# Patient Record
Sex: MALE | Race: OTHER | NOT HISPANIC OR LATINO | URBAN - METROPOLITAN AREA
[De-identification: names, ages, dates, MRNs, and addresses within clinical notes are randomized per-mention and may not be internally consistent; named-entity substitution may affect disease eponyms.]

---

## 2017-02-22 ENCOUNTER — OUTPATIENT (OUTPATIENT)
Dept: OUTPATIENT SERVICES | Facility: HOSPITAL | Age: 62
LOS: 1 days | End: 2017-02-22
Payer: COMMERCIAL

## 2017-02-22 DIAGNOSIS — R10.9 UNSPECIFIED ABDOMINAL PAIN: ICD-10-CM

## 2017-02-22 DIAGNOSIS — R07.9 CHEST PAIN, UNSPECIFIED: ICD-10-CM

## 2017-02-22 PROCEDURE — 71250 CT THORAX DX C-: CPT | Mod: 26

## 2017-02-22 PROCEDURE — 74176 CT ABD & PELVIS W/O CONTRAST: CPT

## 2017-02-22 PROCEDURE — 74176 CT ABD & PELVIS W/O CONTRAST: CPT | Mod: 26

## 2017-02-22 PROCEDURE — 71250 CT THORAX DX C-: CPT

## 2017-03-08 ENCOUNTER — OUTPATIENT (OUTPATIENT)
Dept: OUTPATIENT SERVICES | Facility: HOSPITAL | Age: 62
LOS: 1 days | End: 2017-03-08
Payer: COMMERCIAL

## 2017-03-08 DIAGNOSIS — R13.10 DYSPHAGIA, UNSPECIFIED: ICD-10-CM

## 2017-03-08 PROCEDURE — 74220 X-RAY XM ESOPHAGUS 1CNTRST: CPT

## 2017-03-08 PROCEDURE — 74220 X-RAY XM ESOPHAGUS 1CNTRST: CPT | Mod: 26

## 2017-04-11 ENCOUNTER — EMERGENCY (EMERGENCY)
Facility: HOSPITAL | Age: 62
LOS: 1 days | Discharge: HOME | End: 2017-04-11
Attending: EMERGENCY MEDICINE
Payer: MEDICARE

## 2017-04-11 VITALS
HEIGHT: 65 IN | WEIGHT: 149.91 LBS | RESPIRATION RATE: 18 BRPM | DIASTOLIC BLOOD PRESSURE: 87 MMHG | SYSTOLIC BLOOD PRESSURE: 144 MMHG | OXYGEN SATURATION: 100 % | TEMPERATURE: 98 F | HEART RATE: 84 BPM

## 2017-04-11 VITALS
TEMPERATURE: 98 F | RESPIRATION RATE: 16 BRPM | OXYGEN SATURATION: 99 % | HEART RATE: 80 BPM | DIASTOLIC BLOOD PRESSURE: 77 MMHG | SYSTOLIC BLOOD PRESSURE: 136 MMHG

## 2017-04-11 DIAGNOSIS — S01.01XA LACERATION WITHOUT FOREIGN BODY OF SCALP, INITIAL ENCOUNTER: ICD-10-CM
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
## 2017-04-11 DIAGNOSIS — Z88.0 ALLERGY STATUS TO PENICILLIN: ICD-10-CM

## 2017-04-11 DIAGNOSIS — Z88.8 ALLERGY STATUS TO OTHER DRUGS, MEDICAMENTS AND BIOLOGICAL SUBSTANCES STATUS: ICD-10-CM

## 2017-04-11 DIAGNOSIS — Z86.69 PERSONAL HISTORY OF OTHER DISEASES OF THE NERVOUS SYSTEM AND SENSE ORGANS: ICD-10-CM

## 2017-04-11 DIAGNOSIS — Z86.59 PERSONAL HISTORY OF OTHER MENTAL AND BEHAVIORAL DISORDERS: ICD-10-CM

## 2017-04-11 DIAGNOSIS — S80.02XA CONTUSION OF LEFT KNEE, INITIAL ENCOUNTER: ICD-10-CM

## 2017-04-11 PROCEDURE — 12002 RPR S/N/AX/GEN/TRNK2.6-7.5CM: CPT

## 2017-04-11 PROCEDURE — 99284 EMERGENCY DEPT VISIT MOD MDM: CPT | Mod: 25

## 2017-04-11 PROCEDURE — 72125 CT NECK SPINE W/O DYE: CPT

## 2017-04-11 PROCEDURE — 70450 CT HEAD/BRAIN W/O DYE: CPT

## 2017-04-11 PROCEDURE — 72125 CT NECK SPINE W/O DYE: CPT | Mod: 26

## 2017-04-11 PROCEDURE — 70450 CT HEAD/BRAIN W/O DYE: CPT | Mod: 26

## 2017-04-11 RX ORDER — IBUPROFEN 200 MG
600 TABLET ORAL ONCE
Qty: 0 | Refills: 0 | Status: COMPLETED | OUTPATIENT
Start: 2017-04-11 | End: 2017-04-11

## 2017-04-11 RX ADMIN — Medication 600 MILLIGRAM(S): at 17:37

## 2017-04-11 NOTE — ED PROVIDER NOTE - OBJECTIVE STATEMENT
62 y/o male with a h/o schizoaffective disorder and sz disorder was brought from [facility] after he reportedly tripped and fell no sz he sustained a cut on the back of his head and he hurt his left knee and he denies other c/o no loc it was witnessed by staff according top person who came with him from [place]

## 2017-04-11 NOTE — ED ADULT TRIAGE NOTE - CHIEF COMPLAINT QUOTE
BIBA from [facility]. patient was running down hallway and fell causing occipital laceration. -LOC as per witness RN at [facility]. no blood thinners. Hx Seizures but did not have seizure.

## 2017-04-11 NOTE — ED PROVIDER NOTE - PROGRESS NOTE DETAILS
pt ct neg and wound care given will rec rest head injury instructions and f/u pmd pt had 7 trish by GIANFRANCO Gibson and is safe for d/c at this time

## 2017-04-11 NOTE — ED ADULT NURSE NOTE - OBJECTIVE STATEMENT
pt presents to ED with laceration to back of head s/p trip and fall at [facility]. pt is a [facility] [facility] resident. [facility] aide at bedside. pt awake and alert. breahitng si even and unlabored. denies LOC. denies n/v. denies any complaints at time. will continue to monitor and reassess,

## 2017-04-11 NOTE — ED PROVIDER NOTE - MUSCULOSKELETAL, MLM
Spine appears normal, range of motion is not limited, no muscle or joint tenderness except left knee sts over left prox tibial tuberosity

## 2017-04-11 NOTE — ED ADULT NURSE NOTE - CHIEF COMPLAINT QUOTE
BIBA from [facility]. patient was running down hallway and fell causing occipital laceration. -LOC as per witness RN at [place]. no blood thinners. Hx Seizures but did not have seizure.

## 2018-08-15 PROBLEM — F25.9 SCHIZOAFFECTIVE DISORDER, UNSPECIFIED: Chronic | Status: ACTIVE | Noted: 2017-04-11

## 2018-08-15 PROBLEM — R56.9 UNSPECIFIED CONVULSIONS: Chronic | Status: ACTIVE | Noted: 2017-04-11

## 2018-11-21 ENCOUNTER — OUTPATIENT (OUTPATIENT)
Dept: OUTPATIENT SERVICES | Facility: HOSPITAL | Age: 63
LOS: 1 days | End: 2018-11-21
Payer: MEDICARE

## 2018-11-21 DIAGNOSIS — R01.1 CARDIAC MURMUR, UNSPECIFIED: ICD-10-CM

## 2018-11-21 PROCEDURE — 93306 TTE W/DOPPLER COMPLETE: CPT

## 2018-11-21 PROCEDURE — 93306 TTE W/DOPPLER COMPLETE: CPT | Mod: 26

## 2018-12-14 ENCOUNTER — NON-APPOINTMENT (OUTPATIENT)
Age: 63
End: 2018-12-14

## 2018-12-14 ENCOUNTER — APPOINTMENT (OUTPATIENT)
Dept: CARDIOLOGY | Facility: CLINIC | Age: 63
End: 2018-12-14
Payer: MEDICARE

## 2018-12-14 VITALS
HEART RATE: 62 BPM | DIASTOLIC BLOOD PRESSURE: 67 MMHG | SYSTOLIC BLOOD PRESSURE: 114 MMHG | WEIGHT: 134 LBS | HEIGHT: 65 IN | BODY MASS INDEX: 22.33 KG/M2 | OXYGEN SATURATION: 98 %

## 2018-12-14 PROCEDURE — 93000 ELECTROCARDIOGRAM COMPLETE: CPT

## 2018-12-14 PROCEDURE — 99204 OFFICE O/P NEW MOD 45 MIN: CPT

## 2018-12-14 NOTE — REASON FOR VISIT
[Consultation] : a consultation regarding [Other: _____] : [unfilled] [Mitral Regurgitation] : mitral regurgitation

## 2018-12-14 NOTE — ASSESSMENT
[FreeTextEntry1] : Summary:\par  1. Left ventricular ejection fraction, by visual estimation, is >75%.\par  2. Normal global left ventricular systolic function.\par  3. Spectral Doppler shows impaired relaxation pattern of left \par ventricular myocardial filling (Grade I diastolic dysfunction).\par  4. Moderate anterior leaflet systolic anterior motion of the mitral \par valve with a resting gradent of 27 mm/Hg .\par  5. Eccentric Mitral regurgitation, directed posteriorly, possible Severe \par MR cannot be ruled out.\par  6. Trace tricuspid regurgitation.\par  7. Mild aortic regurgitation.\par  8. Sclerotic aortic valve with normal opening.\par  9. Trace pulmonic valve regurgitation.\par 10. Turbulent flow noted in LVOT due to JULIANNE.\par \par [id number] Christiano Cabello MD, Electronically signed on 11/23/2018 at 1:17:59 \par PM\par \par \par EKG- Poor baseline. Probably NSR\par \par Assessment:\par 1. Probably Severe Mitral Regurgitation- Based on the available history, patient is probably asymptomatic. Patient is also not a surgical candidate for mitral valve surgery\par 2. Multiple Psych/Medical Issues\par \par Recommendations:\par 1. F/U in 6 months. No need for any medications to be started since he is asymptomatic.\par

## 2018-12-14 NOTE — HISTORY OF PRESENT ILLNESS
[FreeTextEntry1] : The patient is a 63-year-old  male who is here for cardiac consultation, patient is a chronic resident of [facility]. Patient is accompanied by therapy aide Martha Diaz.\par \par Patient has a schizophrenia, cerebral palsy and multiple other medical/psych issues. Patient is not oriented to place, person or time. Patient is unable to provide any medical history. Aide does not report any cardiopulmonary complaints. No one has noticed any cardiopulmonary complaints.\par \par ROS: Unable to obtain

## 2018-12-14 NOTE — PHYSICAL EXAM
[General Appearance - Well Developed] : well developed [Normal Appearance] : normal appearance [Well Groomed] : well groomed [General Appearance - Well Nourished] : well nourished [No Deformities] : no deformities [General Appearance - In No Acute Distress] : no acute distress [Normal Conjunctiva] : the conjunctiva exhibited no abnormalities [Eyelids - No Xanthelasma] : the eyelids demonstrated no xanthelasmas [Normal Oral Mucosa] : normal oral mucosa [No Oral Pallor] : no oral pallor [No Oral Cyanosis] : no oral cyanosis [Normal Jugular Venous A Waves Present] : normal jugular venous A waves present [Normal Jugular Venous V Waves Present] : normal jugular venous V waves present [No Jugular Venous Sykes A Waves] : no jugular venous sykes A waves [Heart Rate And Rhythm] : heart rate and rhythm were normal [Heart Sounds] : normal S1 and S2 [Respiration, Rhythm And Depth] : normal respiratory rhythm and effort [Exaggerated Use Of Accessory Muscles For Inspiration] : no accessory muscle use [Auscultation Breath Sounds / Voice Sounds] : lungs were clear to auscultation bilaterally [Abdomen Soft] : soft [Abdomen Tenderness] : non-tender [Abdomen Mass (___ Cm)] : no abdominal mass palpated [Abnormal Walk] : normal gait [Gait - Sufficient For Exercise Testing] : the gait was sufficient for exercise testing [Nail Clubbing] : no clubbing of the fingernails [Cyanosis, Localized] : no localized cyanosis [Petechial Hemorrhages (___cm)] : no petechial hemorrhages [Skin Color & Pigmentation] : normal skin color and pigmentation [] : no rash [No Venous Stasis] : no venous stasis [Skin Lesions] : no skin lesions [No Skin Ulcers] : no skin ulcer [No Xanthoma] : no  xanthoma was observed [FreeTextEntry1] : NOT ORIENTED TO PLACE/PERSON OR TIME

## 2019-01-29 ENCOUNTER — EMERGENCY (EMERGENCY)
Facility: HOSPITAL | Age: 64
LOS: 1 days | Discharge: HOME | End: 2019-01-29
Attending: EMERGENCY MEDICINE
Payer: COMMERCIAL

## 2019-01-29 VITALS
DIASTOLIC BLOOD PRESSURE: 87 MMHG | TEMPERATURE: 98 F | OXYGEN SATURATION: 100 % | SYSTOLIC BLOOD PRESSURE: 145 MMHG | RESPIRATION RATE: 18 BRPM | HEART RATE: 59 BPM

## 2019-01-29 VITALS — HEIGHT: 70 IN | WEIGHT: 154.98 LBS

## 2019-01-29 PROCEDURE — 12011 RPR F/E/E/N/L/M 2.5 CM/<: CPT

## 2019-01-29 PROCEDURE — 99283 EMERGENCY DEPT VISIT LOW MDM: CPT | Mod: 25

## 2019-01-29 PROCEDURE — 90715 TDAP VACCINE 7 YRS/> IM: CPT

## 2019-01-29 PROCEDURE — 96372 THER/PROPH/DIAG INJ SC/IM: CPT | Mod: XU

## 2019-01-29 PROCEDURE — 90471 IMMUNIZATION ADMIN: CPT

## 2019-01-29 RX ORDER — HALOPERIDOL DECANOATE 100 MG/ML
5 INJECTION INTRAMUSCULAR ONCE
Qty: 0 | Refills: 0 | Status: COMPLETED | OUTPATIENT
Start: 2019-01-29 | End: 2019-01-29

## 2019-01-29 RX ORDER — TETANUS TOXOID, REDUCED DIPHTHERIA TOXOID AND ACELLULAR PERTUSSIS VACCINE, ADSORBED 5; 2.5; 8; 8; 2.5 [IU]/.5ML; [IU]/.5ML; UG/.5ML; UG/.5ML; UG/.5ML
0.5 SUSPENSION INTRAMUSCULAR ONCE
Qty: 0 | Refills: 0 | Status: COMPLETED | OUTPATIENT
Start: 2019-01-29 | End: 2019-01-29

## 2019-01-29 RX ADMIN — HALOPERIDOL DECANOATE 5 MILLIGRAM(S): 100 INJECTION INTRAMUSCULAR at 11:40

## 2019-01-29 RX ADMIN — TETANUS TOXOID, REDUCED DIPHTHERIA TOXOID AND ACELLULAR PERTUSSIS VACCINE, ADSORBED 0.5 MILLILITER(S): 5; 2.5; 8; 8; 2.5 SUSPENSION INTRAMUSCULAR at 11:40

## 2019-01-29 NOTE — ED ADULT TRIAGE NOTE - CHIEF COMPLAINT QUOTE
Patient BIBA to ED today after trip and fall onto his face causing a laceration above his left eye.  Patient had no LOC, denies pain, no blood thinners.  Patient states his tripped himself on purpose so he can come to hospital.  Patient arrived with aide from [place] Patient BIBA to ED today after trip and fall onto his face causing a laceration above his left eye.  Patient had no LOC, denies pain, no blood thinners.  Patient states his tripped himself on purpose so he can come to hospital.  Patient arrived with aide from [place].

## 2019-01-29 NOTE — ED PROVIDER NOTE - MEDICAL DECISION MAKING DETAILS
No LOC, at neurologic baseline as per accompanying mental health aid.   Patient agitated about getting "a needle".  Will give Haldol 5 mg IM and reassess, adacel and repair laceration.

## 2019-01-29 NOTE — ED PROCEDURE NOTE - CPROC ED INFORMED CONSENT1
Benefits, risks, and possible complications of procedure explained to patient/caregiver who verbalized understanding and gave verbal consent. room air

## 2019-01-29 NOTE — ED PROVIDER NOTE - ATTENDING CONTRIBUTION TO CARE
witnessed trip and fall hitting head on floor, sustaining laceration to left eyebrow.  nO loc. no change in behavior, no vomiting.  pt denies ha.  PE;  awake and interactive, STS and 3cm lac to supraorbital ridge, eomi, No localized facial bone bone tenderness.  no jaw malocclusion or tenderness.  FROM [facility].  Wound repair by ACP.

## 2019-01-29 NOTE — ED PROVIDER NOTE - OBJECTIVE STATEMENT
64 y/o M PMH schizophrenia, seizure, [place].  No LOC, denies, ha, n/v.  laceration to left eyebrow, bleeding controlled.  No mention of Td status in paperwork from [facility].

## 2019-04-01 NOTE — ED ADULT TRIAGE NOTE - TEMPERATURE IN CELSIUS (DEGREES C)
[facility]  [address] [address]  Dept: [phone]  Dept Fax: [phone]  Loc: [phone]    Visit Date: 4/1/2019    Mr. Ronaldo Farias is a 68 y.o. male  who presented for:  Chief Complaint   Patient presents with    3 Month Follow-Up       HPI:   HPI   67 yo M s/p AVR - 23 mm [facility], MV repair 28 mm ring, LIMA to LAD, SVG to OM 11/2018, hospitalization complicated by LORENA who presents for follow-up. He had a thoracentesis post surgery. 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 He is on Coumadin for LORENA. No chest pain, angina, LIRA, orthopnea, PND, sob at rest, palpitations, LE edema, or syncope. Daughter feels that he is back to baseline. Cr 1.4.         Current Outpatient Medications:     furosemide (LASIX) 20 MG tablet, Take 20 mg by mouth 2 times daily, Disp: , Rfl:     potassium chloride (KLOR-CON) 20 MEQ packet, Take 20 mEq by mouth 2 times daily, Disp: , Rfl:     docusate sodium (COLACE) 100 MG capsule, Take 100 mg by mouth 2 times daily, Disp: , Rfl:     losartan (COZAAR) 25 MG tablet, Take 1 tablet by mouth daily, Disp: 90 tablet, Rfl: 3    metoprolol tartrate (LOPRESSOR) 25 MG tablet, Take 0.5 tablets by mouth 2 times daily, Disp: 90 tablet, Rfl: 3    acetaminophen (TYLENOL) 325 MG tablet, Take 2 tablets by mouth every 4 hours as needed for Fever, Disp: 120 tablet, Rfl: 0    aspirin 325 MG tablet, Take 1 tablet by mouth daily, Disp: 30 tablet, Rfl: 3    ferrous sulfate 325 (65 Fe) MG tablet, Take 1 tablet by mouth 2 times daily (with meals), Disp: 30 tablet, Rfl: 3    warfarin (COUMADIN) 5 MG tablet, Take 1 tablet by mouth nightly, Disp: 30 tablet, Rfl: 0    glimepiride (AMARYL) 2 MG tablet, Take 1 tablet by mouth daily (with breakfast) (Patient taking differently: Take 1 mg by mouth daily (with breakfast) ), Disp: 30 tablet, Rfl: 3    nitroGLYCERIN (NITROSTAT) 0.4 MG SL tablet, up to max of 3 total doses. If no relief after 1 dose, call 911., Disp: 25 tablet, Rfl: 3    atorvastatin (LIPITOR) 40 MG tablet, Take 40 mg by mouth daily , Disp: , Rfl:     levothyroxine (SYNTHROID) 50 MCG tablet, Take 50 mcg by mouth Daily, Disp: , Rfl:     ONE TOUCH ULTRA TEST strip, as needed, Disp: , Rfl: 0    gabapentin (NEURONTIN) 800 MG tablet, Take 400 mg by mouth 3 times daily as needed. , Disp: , Rfl:     Past Medical History  Adrian Sharma  has a past medical history of Acute on chronic systolic CHF (congestive heart failure), NYHA class 3 ([facility]) EF 25-30%, CAD (coronary artery disease), CKD (chronic kidney disease) stage 3, GFR 30-59 ml/min ([address] Diabetes mellitus ([address] HTN (hypertension), benign, Hyperlipidemia, Hypothyroidism, Mitral and aortic incompetence, Neuropathy, Positive blood culture, and S/P CABG x 2. Social History  Adrian Sharma  reports that he is a non-smoker but has been exposed to tobacco smoke. He has been exposed to 1.00 pack per day for the past 3.00 years. He has never used smokeless tobacco. He reports that he drinks about 8.4 oz of alcohol per week. He reports that he does not use drugs. Family History  Adrian Sharma family history includes Cancer (age of onset: 28) in his brother; Cancer (age of onset: 46) in his brother; Diabetes in his mother; Other in his mother. There is no family history of bicuspid aortic valve, aneurysms, heart transplant, pacemakers, defibrillators, or sudden cardiac death.       Past Surgical History   Past Surgical History:   Procedure Laterality Date    CARDIAC SURGERY      heart cath    CATARACT REMOVAL WITH IMPLANT Bilateral     COLONOSCOPY      CORONARY ARTERY BYPASS GRAFT  11/14/2018    DIAGNOSTIC CARDIAC CATH LAB PROCEDURE      EYE SURGERY      [place] CABG, VEIN, THREE N/A 11/14/2018    CABG CORONARY ARTERY BYPASS,  AORTIC AND MITRAL VALVE REPAIR WITH MICHELE, BALLOON PUMP INSERTION performed by Deanne Shetty MD at [id number] [place] [address] R-T [facility] BERENICEISJ I&R ONLY Left 10/25/2018    TRANSESOPHAGEAL ECHOCARDIOGRAM performed by Jaz Gramajo MD at [facility]       Review of Systems   Constitutional: Negative for chills and fever  HENT: Negative for congestion, sinus pressure, sneezing and sore throat. Eyes: Negative for pain, discharge, redness and itching. Respiratory: Negative for apnea, cough  Gastrointestinal: Negative for blood in stool, constipation, diarrhea   Endocrine: Negative for cold intolerance, heat intolerance, polydipsia. Genitourinary: Negative for dysuria, enuresis, flank pain and hematuria. Musculoskeletal: Negative for arthralgias, joint swelling and neck pain. Neurological: Negative for numbness and headaches. Psychiatric/Behavioral: Negative for agitation, confusion, decreased concentration and dysphoric mood. Objective:     /62   Pulse 60   Ht 6' (1.829 m)   Wt 208 lb (94.3 kg)   BMI 28.21 kg/m²     Wt Readings from Last 3 Encounters:   04/01/19 208 lb (94.3 kg)   02/26/19 207 lb 9.6 oz (94.2 kg)   02/20/19 206 lb 12.8 oz (93.8 kg)     BP Readings from Last 3 Encounters:   04/01/19 110/62   02/26/19 (!) 140/68   02/20/19 (!) 144/72       Nursing note and vitals reviewed. Physical Exam   Constitutional: Oriented to person, place, and time. Appears well-developed and well-nourished. HENT:   Head: Normocephalic and atraumatic. Eyes: EOM are normal. Pupils are equal, round, and reactive to light. Neck: Normal range of motion. Neck supple. No JVD present. Cardiovascular: Normal rate, regular rhythm, normal heart sounds and intact distal pulses. No murmur heard. Pulmonary/Chest: Effort normal and breath sounds normal. No respiratory distress. No wheezes. No rales. Abdominal: Soft. Bowel sounds are normal. No distension. There is no tenderness. Musculoskeletal: Normal range of motion. No edema. Neurological: Alert and oriented to person, place, and time. No cranial nerve deficit.  Coordination normal.   Skin: Skin is warm and dry. Psychiatric: Normal mood and affect.        No results found for: CKTOTAL, CKMB, CKMBINDEX    Lab Results   Component Value Date    WBC 2.2 12/06/2018    RBC 2.81 12/06/2018    RBC 0-5 10/14/2016    HGB 8.1 12/06/2018    HCT 26.1 12/06/2018    MCV 92.9 12/06/2018    MCH 28.8 12/06/2018    MCHC 31.0 12/06/2018     12/06/2018    MPV 9.0 12/06/2018       Lab Results   Component Value Date     03/05/2019    K 5.0 03/05/2019    K 4.4 11/14/2018     03/05/2019    CO2 24 03/05/2019    BUN 21 03/05/2019    LABALBU 3.5 11/10/2018    CREATININE 1.4 03/05/2019    CALCIUM 9.5 03/05/2019    LABGLOM 60 03/04/2019    GLUCOSE 95 03/05/2019       Lab Results   Component Value Date    ALKPHOS 85 11/10/2018    ALT 19 11/10/2018    AST 26 11/10/2018    PROT 7.4 11/10/2018    BILITOT 0.8 11/10/2018    BILITOT NEGATIVE 10/14/2016    BILIDIR <0.2 11/05/2018    LABALBU 3.5 11/10/2018       Lab Results   Component Value Date    MG 2.5 12/06/2018       Lab Results   Component Value Date    INR 2.02 (H) 12/27/2018    INR 4.54 (H) 12/06/2018    INR 5.43 (HH) 12/06/2018         Lab Results   Component Value Date    LABA1C 6.0 11/07/2018       Lab Results   Component Value Date    TRIG 172 11/10/2018    HDL 27 11/10/2018    LDLCALC 70 11/10/2018       Lab Results   Component Value Date    TSH 2.820 10/23/2018         Testing Reviewed:      I have individually reviewed the cardiac test below:    ECHO:   Results for orders placed during the hospital encounter of 10/21/18   Echocardiogram 2D/ M-Mode/ Colorflow/ Do    Narrative Transthoracic Echocardiography Report (TTE)     Demographics      Patient Name     KHUSHI Colvin Gender                Male      MR #             [id number]    Race                                                      Ethnicity      Account #        [de-identified]    Room Number           01      Accession Number [id number]    Date of Study         10/21/2018 Date of Birth    1949   Referring Physician   Dl Pitts MD      Age              68 year(s)   Sonographer           Ese Smith [facility]                                    Interpreting          Dl Zacarias MD                                 Physician     Procedure    Type of Study      TTE procedure:ECHOCARDIOGRAM COMPLETE 2D W DOPPLER W COLOR. Procedure Date  Date: 10/21/2018 Start: 07:45 PM    Study Location: Emergency Room  Technical Quality: Limited visualization due to poor acoustical window. Indications:Hypotension and Elevated troponin. Additional Medical History:Weakness, fever, hyperlipidemia, hypertension,  diabetic    Patient Status: STAT    Height: 72.05 inches Weight: 218.26 pounds BSA: 2.21 m^2 BMI: 29.56 kg/m^2    BP: 84/53 mmHg     Conclusions      Summary   Ejection fraction is visually estimated at 35%. There was moderate global hypokinesis of the left ventricle. Left Ventricular size is Mildly increased . Hypokinetic motion of the mid anteroseptal wall noted in the left   ventricle. Aortic valve appears tricuspid. Thickened aortic valve leaflets noted. Aortic valve leaflets are Moderately calcified. The maximum aortic valve gradient is 30 mmHg, the mean gradient is 20   mmHg, and the peak velocity is 300 cm/s. There is moderate aortic stenosis with valve area of 1.1 sq cm. Mild-to-moderate aortic regurgitation is noted. Myxomatotic degeneration of mitral valve. Decreased mitral valve mobility noted. Calcification of the mitral valve noted. Moderate-to-severe mitral regurgitation with posteriorly directed jet.       Signature      ----------------------------------------------------------------   Electronically signed by Dl Zacarias MD (Interpreting   physician) on 10/21/2018 at 09:11 PM   ----------------------------------------------------------------      Findings Mitral Valve   Myxomatotic degeneration of mitral valve. Decreased mitral valve mobility noted. Calcification of the mitral valve noted. Moderate-to-severe mitral regurgitation with posteriorly directed jet. Aortic Valve   Aortic valve appears tricuspid. Thickened aortic valve leaflets noted. Aortic valve leaflets are Moderately calcified. The maximum aortic valve gradient is 30 mmHg, the mean gradient is 20   mmHg, and the peak velocity is 300 cm/s. There is moderate aortic stenosis with valve area of 1.1 sq cm. Mild-to-moderate aortic regurgitation is noted. Tricuspid Valve   Mild tricuspid regurgitation. Tricuspid valve was not well visualized. Pulmonic Valve   Trivial pulmonic regurgitation visualized. The pulmonic valve was not well visualized . Left Atrium   Mildly dilated left atrium. Left Ventricle   Ejection fraction is visually estimated at 35%. There was moderate global hypokinesis of the left ventricle. Left Ventricular size is Mildly increased . Hypokinetic motion of the mid anteroseptal wall noted in the left   ventricle. Right Atrium   Right atrial size was normal.      Right Ventricle   The right ventricular size was normal with normal systolic function and   wall thickness. Pericardial Effusion   The pericardium was normal in appearance with no evidence of a pericardial   effusion. Pleural Effusion   No evidence of pleural effusion. Aorta / Great Vessels   -Aortic root dimension within normal limits.   -The Pulmonary artery is within normal limits. -IVC size is within normal limits with normal respiratory phasic changes.      M-Mode/2D Measurements & Calculations      LV Diastolic    LV Systolic Dimension: 4.7  AV Cusp Separation: 1.2 cmLA   Dimension: 5.4  cm                          Dimension: 4.2 cmAO Root   cm              LV Volume Diastolic: 259 ml Dimension: 3.5 cm   LV FS:13 %      LV Volume Systolic: 875 ml   LV PW II  Re-check TTE.  D/w Hematology regarding Coumadin. Continue medical therapy for CHF. BP and HR well controlled. Cut ASA to 81 mg q day. He has NTG SL prn. Finishing cardiac rehab. Discussed diet/exercise/BP/weight loss/health lifestyle choices/lipids; the patient understands the goals and will try to comply.       Disposition:  6 months         Electronically signed by Ryland Deal MD   4/1/2019 at 9:30 AM 36.6

## 2019-06-19 ENCOUNTER — APPOINTMENT (OUTPATIENT)
Dept: CARDIOLOGY | Facility: CLINIC | Age: 64
End: 2019-06-19

## 2019-07-09 NOTE — ED PROVIDER NOTE - CARDIOVASCULAR [-], MLM
Please call and check on the patient.  Recommend support hose, elevating her legs, avoiding added salt, good healthy nutrition as malnutrition can contribute to this.  The please make sure that she is not having any increased shortness of breath.  Recheck tomorrow as scheduled.   no syncope/no chest pain

## 2019-08-17 NOTE — ED PROVIDER NOTE - DURATION
today
I have personally seen and examined this patient.  I have fully participated in the care of this patient. I have reviewed all pertinent clinical information, including history, physical exam, plan and the Resident’s note and agree except as noted.

## 2019-11-22 ENCOUNTER — NON-APPOINTMENT (OUTPATIENT)
Age: 64
End: 2019-11-22

## 2019-11-22 ENCOUNTER — APPOINTMENT (OUTPATIENT)
Dept: CARDIOLOGY | Facility: CLINIC | Age: 64
End: 2019-11-22
Payer: MEDICARE

## 2019-11-22 VITALS — HEART RATE: 68 BPM | SYSTOLIC BLOOD PRESSURE: 123 MMHG | DIASTOLIC BLOOD PRESSURE: 71 MMHG

## 2019-11-22 VITALS
DIASTOLIC BLOOD PRESSURE: 71 MMHG | BODY MASS INDEX: 22.16 KG/M2 | HEART RATE: 68 BPM | SYSTOLIC BLOOD PRESSURE: 123 MMHG | HEIGHT: 65 IN | WEIGHT: 133 LBS | OXYGEN SATURATION: 91 %

## 2019-11-22 VITALS — OXYGEN SATURATION: 96 %

## 2019-11-22 PROCEDURE — 99213 OFFICE O/P EST LOW 20 MIN: CPT

## 2019-11-22 PROCEDURE — 93000 ELECTROCARDIOGRAM COMPLETE: CPT

## 2019-11-22 NOTE — HISTORY OF PRESENT ILLNESS
[FreeTextEntry1] : The patient is a 64-year-old  male who is here for f/u cardiac consultation, patient is a chronic resident of [facility]. Patient is accompanied by therapy malachi Diaz.\par \par Patient has a schizophrenia, cerebral palsy, mild intellectual disability, seizure disoredr, and multiple other medical/psych issues. Patient is not oriented to place, person or time. Patient is unable to provide any medical history. Aide reports that at night he has some difficulty sleeping, day time he is able to walk around without any noticeable symptoms.\par \par ROS: Unable to obtain\par \par Accompanied by : Therapy Malachi- George Cotton

## 2019-11-22 NOTE — ASSESSMENT
[FreeTextEntry1] : Summary:\par  1. Left ventricular ejection fraction, by visual estimation, is >75%.\par  2. Normal global left ventricular systolic function.\par  3. Spectral Doppler shows impaired relaxation pattern of left \par ventricular myocardial filling (Grade I diastolic dysfunction).\par  4. Moderate anterior leaflet systolic anterior motion of the mitral \par valve with a resting gradent of 27 mm/Hg .\par  5. Eccentric Mitral regurgitation, directed posteriorly, possible Severe \par MR cannot be ruled out.\par  6. Trace tricuspid regurgitation.\par  7. Mild aortic regurgitation.\par  8. Sclerotic aortic valve with normal opening.\par  9. Trace pulmonic valve regurgitation.\par 10. Turbulent flow noted in LVOT due to JULIANNE.\par \par [id number] Christiano Cabello MD, Electronically signed on 11/23/2018 at 1:17:59 \par PM\par \par \par EKG- Poor baseline. Probably NSR\par \par Assessment:\par 1. Probably Severe Mitral Regurgitation- Based on the available history, patient is probably asymptomatic. Patient is also not a surgical candidate for mitral valve surgery\par 2. Multiple Psych/Medical Issues\par \par Recommendations:\par 1. ECHO\par 2. F/U 3 months\par

## 2019-11-22 NOTE — PHYSICAL EXAM
[General Appearance - Well Nourished] : well nourished [Normal Conjunctiva] : the conjunctiva exhibited no abnormalities [Normal Oropharynx] : normal oropharynx [FreeTextEntry1] : No JVD [Auscultation Breath Sounds / Voice Sounds] : lungs were clear to auscultation bilaterally [Heart Rate And Rhythm] : heart rate and rhythm were normal [Heart Sounds] : normal S1 and S2 [Bowel Sounds] : normal bowel sounds [Abdomen Soft] : soft [Abnormal Walk] : normal gait [Cyanosis, Localized] : no localized cyanosis

## 2019-12-09 ENCOUNTER — EMERGENCY (EMERGENCY)
Facility: HOSPITAL | Age: 64
LOS: 1 days | Discharge: HOME | End: 2019-12-09
Attending: EMERGENCY MEDICINE
Payer: COMMERCIAL

## 2019-12-09 VITALS
HEIGHT: 70 IN | DIASTOLIC BLOOD PRESSURE: 81 MMHG | TEMPERATURE: 98 F | SYSTOLIC BLOOD PRESSURE: 124 MMHG | WEIGHT: 160.06 LBS | OXYGEN SATURATION: 96 % | RESPIRATION RATE: 20 BRPM | HEART RATE: 70 BPM

## 2019-12-09 VITALS
TEMPERATURE: 98 F | HEART RATE: 68 BPM | SYSTOLIC BLOOD PRESSURE: 156 MMHG | OXYGEN SATURATION: 100 % | DIASTOLIC BLOOD PRESSURE: 78 MMHG | RESPIRATION RATE: 18 BRPM

## 2019-12-09 PROCEDURE — 12011 RPR F/E/E/N/L/M 2.5 CM/<: CPT

## 2019-12-09 PROCEDURE — 72125 CT NECK SPINE W/O DYE: CPT

## 2019-12-09 PROCEDURE — 70450 CT HEAD/BRAIN W/O DYE: CPT | Mod: 26

## 2019-12-09 PROCEDURE — 99284 EMERGENCY DEPT VISIT MOD MDM: CPT | Mod: 25

## 2019-12-09 PROCEDURE — 90471 IMMUNIZATION ADMIN: CPT

## 2019-12-09 PROCEDURE — 72125 CT NECK SPINE W/O DYE: CPT | Mod: 26

## 2019-12-09 PROCEDURE — 90715 TDAP VACCINE 7 YRS/> IM: CPT

## 2019-12-09 PROCEDURE — 70450 CT HEAD/BRAIN W/O DYE: CPT

## 2019-12-09 RX ORDER — TETANUS TOXOID, REDUCED DIPHTHERIA TOXOID AND ACELLULAR PERTUSSIS VACCINE, ADSORBED 5; 2.5; 8; 8; 2.5 [IU]/.5ML; [IU]/.5ML; UG/.5ML; UG/.5ML; UG/.5ML
0.5 SUSPENSION INTRAMUSCULAR ONCE
Refills: 0 | Status: COMPLETED | OUTPATIENT
Start: 2019-12-09 | End: 2019-12-09

## 2019-12-09 RX ADMIN — TETANUS TOXOID, REDUCED DIPHTHERIA TOXOID AND ACELLULAR PERTUSSIS VACCINE, ADSORBED 0.5 MILLILITER(S): 5; 2.5; 8; 8; 2.5 SUSPENSION INTRAMUSCULAR at 12:02

## 2019-12-09 NOTE — ED PROVIDER NOTE - CLINICAL SUMMARY MEDICAL DECISION MAKING FREE TEXT BOX
65 y/o M with schizophrenia and seizure d/o who is a poor historian with some dementia presents from [facility] for a witnessed fall without LOC, no vomiting, acting at his normal baseline. Patient unable to give good history. Will CT head and C/S, update tetanus and repair laceration and discharge back to [facility].

## 2019-12-09 NOTE — ED ADULT NURSE NOTE - NS ED NOTE  TALK SOMEONE YN
Alberto Quintero is here to discuss surgical options. I had a 45 minute group discussion with greater than 50% of the time counseling the patient regarding joint arthroplasty, its preoperative evaluation, and post operative physical therapy, and pain managaement We went over the surgical procedure of hip replacement risks and complications of hip replacement including bleeding, infection, fracture, dislocation, instability, persistent pain, leg length discrepancy, DVT, pulmonary embolism, post operative cognitive disorders, and need for revision. She  understands these and we will see the patient in the operating room. There were no vitals taken for this visit. As this patient has demonstrated risk factors for osteoporosis, such as age greater than [de-identified] years and evidence of a fracture, I have referred the patient back to the primary care physician for evaluation for osteoporosis, including consideration for DEXA scanning, if this is felt to be clinically indicated. The patient is advised to contact the primary care physician to follow-up for further evaluation. Plan:  right total hip arthroplasty to be tenatively performed on 1/31/18. Please inform the patient's primary care provider.
No

## 2019-12-09 NOTE — ED ADULT TRIAGE NOTE - CHIEF COMPLAINT QUOTE
pt s/p fall at [facility]. pt has lac to right eyebrow, dressing applied by EMS. pt denies complaints at this time. pt is A&OX2.

## 2019-12-09 NOTE — ED PROVIDER NOTE - PATIENT PORTAL LINK FT
You can access the FollowMyHealth Patient Portal offered by [facility] by registering at the following website: [email or web address]. By joining [employer]’s FollowMyHealth portal, you will also be able to view your health information using other applications (apps) compatible with our system.

## 2019-12-09 NOTE — ED PROVIDER NOTE - OBJECTIVE STATEMENT
63 y/o M with PMH schizoaffective disorder and seizure disorder from [place] presents s/p a witnessed fall that occurred today, striking the right side of his head and sustaining a laceration. Aide did not witness fall, but per staff that did witness, there was no LOC. Patient ate cookies in the ambulance on the way here and has been at his mental baseline. He perseverates on eating lunch. He cannot recall his last tetanus shot. He denies any pain, asking for a new pair of shoes which he blames due to his fall today. He has allergy to PCN, Thorazine, Depakote, Calcium.

## 2019-12-09 NOTE — ED PROCEDURE NOTE - NS ED ATTENDING STATEMENT MOD
Subjective:      Patient ID: Nan Palafox is a 66 y.o. female.    Chief Complaint: Breast Pain (Right Breast)      HPI: (PF, EPF - 1-3) (Detailed, Comp, - 4) new patient presents with c/o breast pain, onset 4 weeks ago, intermittent, no aggravating factors or relieving factors. Denies breast mass, nipple discharge, skin changes, recalled breast trauma. History of benign excisional biopsy right breast     Last screening mmg 2016 with no abnormality    Menarche at 11   at 20  Partial hysterectomy, Menopause ?, no HRT     Review of Systems  Objective:   Physical Exam   Pulmonary/Chest: She exhibits no mass, no tenderness, no laceration, no edema, no deformity, no swelling and no retraction. Right breast exhibits tenderness. Right breast exhibits no inverted nipple, no mass, no nipple discharge and no skin change. Left breast exhibits tenderness. Left breast exhibits no inverted nipple, no mass, no nipple discharge and no skin change. Breasts are symmetrical. There is no breast swelling.   Large breasts with reported tenderness in the central breast and lateral to the areolar border bilaterally , no mass or skin changes appreciated   Lymphadenopathy:     She has no cervical adenopathy.     She has no axillary adenopathy.        Right: No supraclavicular adenopathy present.        Left: No supraclavicular adenopathy present.     Assessment:       1. Breast pain        Plan:       bilat diag mmg , patient has to return another day for imaging, f/u pending results, bilateral breast tenderness not clinically suspicious for malignancy, most likely cystic or glandular in origin. She reports drinking 4 cups of coffee/day, discussed reduction in caffeine.      She states she is interested in possible breast reduction, she was offered an appt with Dr Ashby but states she prefers to see another provider.        
Attending Only

## 2019-12-09 NOTE — ED ADULT NURSE NOTE - NSIMPLEMENTINTERV_GEN_ALL_ED
Implemented All Universal Safety Interventions:  [place] to call system. Call bell, personal items and telephone within reach. Instruct patient to call for assistance. Room bathroom lighting operational. Non-slip footwear when patient is off stretcher. Physically safe environment: no spills, clutter or unnecessary equipment. Stretcher in lowest position, wheels locked, appropriate side rails in place.

## 2019-12-09 NOTE — ED PROVIDER NOTE - PHYSICAL EXAMINATION
Const: Awake, alert and oriented. In no acute distress. Well appearing.  HEENT: Normocephalic. 1.5 cm laceration to the lateral right eyebrow. Moist mucous membranes.  Eyes: No scleral icterus. EOMI.  Neck:. Soft and supple. Full ROM without pain.  Cardiac: Regular rate and regular rhythm. +S1/S2. No murmurs. Peripheral pulses 2+ and symmetric. No LE edema.  Resp: Speaking in full sentences. No evidence of respiratory distress. No wheezes, rales or rhonchi.  Abd: Soft, non-tender, non-distended. Normal bowel sounds in all 4 quadrants. No guarding or rebound.  Back: Spine midline and non-tender. No CVAT.  Skin: No rashes, abrasions or lacerations.  Neuro: Awake, alert & oriented x 2. CN II-XII symmetrically intact. Moves all extremities symmetrically. Follows commands. Normal speech.

## 2019-12-17 ENCOUNTER — EMERGENCY (EMERGENCY)
Facility: HOSPITAL | Age: 64
LOS: 1 days | Discharge: HOME | End: 2019-12-17
Attending: EMERGENCY MEDICINE
Payer: COMMERCIAL

## 2019-12-17 VITALS
HEART RATE: 62 BPM | DIASTOLIC BLOOD PRESSURE: 90 MMHG | RESPIRATION RATE: 18 BRPM | SYSTOLIC BLOOD PRESSURE: 142 MMHG | OXYGEN SATURATION: 98 %

## 2019-12-17 VITALS
TEMPERATURE: 98 F | DIASTOLIC BLOOD PRESSURE: 89 MMHG | RESPIRATION RATE: 16 BRPM | SYSTOLIC BLOOD PRESSURE: 135 MMHG | OXYGEN SATURATION: 97 % | WEIGHT: 115.08 LBS | HEART RATE: 69 BPM | HEIGHT: 70 IN

## 2019-12-17 PROCEDURE — 72125 CT NECK SPINE W/O DYE: CPT | Mod: 26

## 2019-12-17 PROCEDURE — 12013 RPR F/E/E/N/L/M 2.6-5.0 CM: CPT

## 2019-12-17 PROCEDURE — 70450 CT HEAD/BRAIN W/O DYE: CPT

## 2019-12-17 PROCEDURE — 90715 TDAP VACCINE 7 YRS/> IM: CPT

## 2019-12-17 PROCEDURE — 70450 CT HEAD/BRAIN W/O DYE: CPT | Mod: 26

## 2019-12-17 PROCEDURE — 99284 EMERGENCY DEPT VISIT MOD MDM: CPT | Mod: 25

## 2019-12-17 PROCEDURE — 90471 IMMUNIZATION ADMIN: CPT

## 2019-12-17 PROCEDURE — 72125 CT NECK SPINE W/O DYE: CPT

## 2019-12-17 RX ORDER — TETANUS TOXOID, REDUCED DIPHTHERIA TOXOID AND ACELLULAR PERTUSSIS VACCINE, ADSORBED 5; 2.5; 8; 8; 2.5 [IU]/.5ML; [IU]/.5ML; UG/.5ML; UG/.5ML; UG/.5ML
0.5 SUSPENSION INTRAMUSCULAR ONCE
Refills: 0 | Status: COMPLETED | OUTPATIENT
Start: 2019-12-17 | End: 2019-12-17

## 2019-12-17 RX ADMIN — TETANUS TOXOID, REDUCED DIPHTHERIA TOXOID AND ACELLULAR PERTUSSIS VACCINE, ADSORBED 0.5 MILLILITER(S): 5; 2.5; 8; 8; 2.5 SUSPENSION INTRAMUSCULAR at 01:41

## 2019-12-17 NOTE — ED PROVIDER NOTE - PATIENT PORTAL LINK FT
You can access the FollowMyHealth Patient Portal offered by [facility] by registering at the following website: [email or web address]. By joining [employer]’s FollowMyHealth portal, you will also be able to view your health information using other applications (apps) compatible with our system.

## 2019-12-17 NOTE — ED PROVIDER NOTE - OBJECTIVE STATEMENT
65 y/o M pt with significant PMHx of schizoaffective disorder presents to the ED transfer from inpatient [place] following a fall. Per aide the Pt rolled out of bed and hit his head. Pt reports slight head pain not radiating. Pt is not on blood thinners. denies fever. denies neck pain. no chest pain or sob. no abd pain. no n/v/d. no urinary f/u/d. no back pain. no motor or sensory deficits. denies illicit drug use. no recent travel. no rash. no other acute issues symptoms or concerns

## 2019-12-17 NOTE — ED PROVIDER NOTE - CLINICAL SUMMARY MEDICAL DECISION MAKING FREE TEXT BOX
neurovasc intact lac closed wound care precautions given. return to ed for intractable HA, persistent vomiting, or new onset motor/sensory deficits

## 2019-12-17 NOTE — ED ADULT TRIAGE NOTE - CHIEF COMPLAINT QUOTE
[facility]IB[facility] c/o Fall possibly intentional with laceration. Patient has about 3 inch laceration to right eyebrow bleeding controlled MD [place] at bedside for eval.

## 2019-12-17 NOTE — ED ADULT NURSE REASSESSMENT NOTE - NS ED NURSE REASSESS COMMENT FT1
pt triaged, treated and dispo by provider, pt aide from [place] verbalized understanding of discharge instructions and provided d/c paperwork, pt vaccinated prior to discharge, refer to provider notes.

## 2020-02-24 ENCOUNTER — APPOINTMENT (OUTPATIENT)
Dept: CARDIOLOGY | Facility: CLINIC | Age: 65
End: 2020-02-24
Payer: MEDICARE

## 2020-02-24 PROCEDURE — 93306 TTE W/DOPPLER COMPLETE: CPT

## 2020-03-16 ENCOUNTER — APPOINTMENT (OUTPATIENT)
Dept: CARDIOLOGY | Facility: CLINIC | Age: 65
End: 2020-03-16

## 2020-05-01 ENCOUNTER — APPOINTMENT (OUTPATIENT)
Dept: CARDIOLOGY | Facility: CLINIC | Age: 65
End: 2020-05-01

## 2020-06-29 ENCOUNTER — NON-APPOINTMENT (OUTPATIENT)
Age: 65
End: 2020-06-29

## 2020-06-29 ENCOUNTER — APPOINTMENT (OUTPATIENT)
Dept: CARDIOLOGY | Facility: CLINIC | Age: 65
End: 2020-06-29
Payer: MEDICARE

## 2020-06-29 VITALS
HEIGHT: 65 IN | TEMPERATURE: 98.6 F | BODY MASS INDEX: 20.33 KG/M2 | WEIGHT: 122 LBS | OXYGEN SATURATION: 95 % | HEART RATE: 70 BPM | DIASTOLIC BLOOD PRESSURE: 69 MMHG | SYSTOLIC BLOOD PRESSURE: 121 MMHG

## 2020-06-29 DIAGNOSIS — K59.09 OTHER CONSTIPATION: ICD-10-CM

## 2020-06-29 PROCEDURE — 99214 OFFICE O/P EST MOD 30 MIN: CPT

## 2020-06-29 PROCEDURE — 93000 ELECTROCARDIOGRAM COMPLETE: CPT

## 2020-06-29 RX ORDER — LORAZEPAM 1 MG/1
1 TABLET ORAL TWICE DAILY
Refills: 0 | Status: ACTIVE | COMMUNITY
Start: 2020-06-29

## 2020-06-29 RX ORDER — DOCUSATE SODIUM 100 MG/1
100 CAPSULE ORAL DAILY
Refills: 0 | Status: ACTIVE | COMMUNITY
Start: 2020-06-29

## 2020-06-29 RX ORDER — ADHESIVE TAPE 3"X 2.3 YD
50 MCG TAPE, NON-MEDICATED TOPICAL DAILY
Refills: 0 | Status: ACTIVE | COMMUNITY
Start: 2020-06-29

## 2020-06-29 NOTE — ED PROVIDER NOTE - NS ED MD DISPO DISCHARGE CCDA
Patient/Caregiver provided printed discharge information. O-Z Flap Text: The defect edges were debeveled with a #15 scalpel blade.  Given the location of the defect, shape of the defect and the proximity to free margins an O-Z flap was deemed most appropriate.  Using a sterile surgical marker, an appropriate transposition flap was drawn incorporating the defect and placing the expected incisions within the relaxed skin tension lines where possible. The area thus outlined was incised deep to adipose tissue with a #15 scalpel blade.  The skin margins were undermined to an appropriate distance in all directions utilizing iris scissors.

## 2020-06-29 NOTE — PHYSICAL EXAM
[Normal Conjunctiva] : the conjunctiva exhibited no abnormalities [General Appearance - Well Nourished] : well nourished [Normal Oropharynx] : normal oropharynx [Auscultation Breath Sounds / Voice Sounds] : lungs were clear to auscultation bilaterally [Heart Rate And Rhythm] : heart rate and rhythm were normal [Heart Sounds] : normal S1 and S2 [Bowel Sounds] : normal bowel sounds [Abnormal Walk] : normal gait [Abdomen Soft] : soft [Cyanosis, Localized] : no localized cyanosis [FreeTextEntry1] : No JVD

## 2020-06-29 NOTE — HISTORY OF PRESENT ILLNESS
[FreeTextEntry1] : The patient is a 65-year-old  male who is here for f/u cardiac consultation, patient is a chronic resident of [facility]. Patient is accompanied by therapy aide Martha Diaz.\par \par Patient has a schizophrenia, cerebral palsy, mild intellectual disability, seizure disoredr, and multiple other medical/psych issues. Patient is not oriented to place, person or time. Patient is unable to provide any medical history. \par \par Patient came to the office for f/u, he was accompanied by an aide. Patient had COVID19 in May 2020.(per sister). Patient is unable to give any history. He appears comfortable, not in distress.\par \par ROS: Unable to Obtain\par \par

## 2020-06-29 NOTE — ASSESSMENT
[FreeTextEntry1] : Summary:\par  1. Left ventricular ejection fraction, by visual estimation, is >75%.\par  2. Normal global left ventricular systolic function.\par  3. Spectral Doppler shows impaired relaxation pattern of left \par ventricular myocardial filling (Grade I diastolic dysfunction).\par  4. Moderate anterior leaflet systolic anterior motion of the mitral \par valve with a resting gradent of 27 mm/Hg .\par  5. Eccentric Mitral regurgitation, directed posteriorly, possible Severe \par MR cannot be ruled out.\par  6. Trace tricuspid regurgitation.\par  7. Mild aortic regurgitation.\par  8. Sclerotic aortic valve with normal opening.\par  9. Trace pulmonic valve regurgitation.\par 10. Turbulent flow noted in LVOT due to JULIANNE.\par \par [id number] Christiano Cabello MD, Electronically signed on 11/23/2018 at 1:17:59 \par PM\par \par ECHO 2/2020- LVEF 65-70%. Moderate to Severe MR\par \par EKG-6/29/2020- NSR. Early repolarization.\par \par \par Assessment:\par 1. Probably Severe Mitral Regurgitation- Based on the available history, patient is probably asymptomatic. Patient is also not a surgical candidate for mitral valve surgery\par 2. Multiple Psych/Medical Issues\par \par Recommendations:\par I spoke to his Medical Doctor at [place]. Dr Biggs- Medical Physician.\par Dr Biggs provided me the phone number of his HCP- Sister-Jacey GentileUvcedrtm-[phone].\par I spoke to his sister Jacey today who told me that her brother had COVID19, mainly he had GI symptoms, he is fully recovered. I have informed her about Mitral regurgitation natural history, eventually may have CHF, may need Teri Valve Surgery. However patient is not an Ideal candidate for surgery because of his comorbidities. I have informed her that I have not even sent him for Surgical Consultation. I have informed her that she has to make a decision about  Advanced Directive's DNR etc.\par \par She verbalized her understanding. She said she will talk to her another sister and will make the appropriate decisions.She was informed that she should contact his medica doctor and [place] to address his code status etc.\par All of her questions were answered.\par \par Patient to f/u with me in 6 months

## 2020-07-23 ENCOUNTER — APPOINTMENT (OUTPATIENT)
Dept: CARDIOLOGY | Facility: CLINIC | Age: 65
End: 2020-07-23

## 2020-08-05 ENCOUNTER — APPOINTMENT (OUTPATIENT)
Dept: UROLOGY | Facility: CLINIC | Age: 65
End: 2020-08-05
Payer: MEDICARE

## 2020-08-05 VITALS — SYSTOLIC BLOOD PRESSURE: 142 MMHG | DIASTOLIC BLOOD PRESSURE: 75 MMHG | TEMPERATURE: 98.7 F | HEART RATE: 81 BPM

## 2020-08-05 DIAGNOSIS — R97.20 ELEVATED PROSTATE, SPECIFIC ANTIGEN [PSA]: ICD-10-CM

## 2020-08-05 DIAGNOSIS — L03.039 CELLULITIS OF UNSPECIFIED TOE: ICD-10-CM

## 2020-08-05 DIAGNOSIS — Z87.39 PERSONAL HISTORY OF OTHER DISEASES OF THE MUSCULOSKELETAL SYSTEM AND CONNECTIVE TISSUE: ICD-10-CM

## 2020-08-05 DIAGNOSIS — Z87.19 PERSONAL HISTORY OF OTHER DISEASES OF THE DIGESTIVE SYSTEM: ICD-10-CM

## 2020-08-05 DIAGNOSIS — M81.0 AGE-RELATED OSTEOPOROSIS W/OUT CURRENT PATHOLOGICAL FRACTURE: ICD-10-CM

## 2020-08-05 DIAGNOSIS — Z87.440 PERSONAL HISTORY OF URINARY (TRACT) INFECTIONS: ICD-10-CM

## 2020-08-05 DIAGNOSIS — E83.52 HYPERCALCEMIA: ICD-10-CM

## 2020-08-05 PROCEDURE — 99205 OFFICE O/P NEW HI 60 MIN: CPT | Mod: 25

## 2020-08-05 PROCEDURE — 51798 US URINE CAPACITY MEASURE: CPT | Mod: 59

## 2020-08-05 PROCEDURE — 51741 ELECTRO-UROFLOWMETRY FIRST: CPT

## 2020-08-05 NOTE — HISTORY OF PRESENT ILLNESS
[FreeTextEntry1] : 66 y/o male presenting for initial visit for on and off elevated PSA and occasional incontinence. Pt has h/o UTI.\par \par  cc. \par \par Recurrent UTI maybe secondary to incomplete emptying of bladder. Recurrent increased PSA could be secondary to UTIs. \par \par repeat PSA, get UA and culture \par will review past PSAs, review of psychotropic Meds, DM and enlarged prostate. Will schedule for urodynamics to evaluate detrusor function. \par If not on Flomax , start on 0.4 mg PO daily \par \par Follow up in one week.\par

## 2020-08-05 NOTE — LETTER BODY
[Dear  ___] : Dear  [unfilled], [Consult Letter:] : I had the pleasure of evaluating your patient, [unfilled]. [Please see my note below.] : Please see my note below. [Consult Closing:] : Thank you very much for allowing me to participate in the care of this patient.  If you have any questions, please do not hesitate to contact me. [Sincerely,] : Sincerely, [FreeTextEntry3] : Jefferson Agosto MD\par

## 2020-08-05 NOTE — PHYSICAL EXAM
[General Appearance - Well Developed] : well developed [General Appearance - Well Nourished] : well nourished [Normal Appearance] : normal appearance [Well Groomed] : well groomed [General Appearance - In No Acute Distress] : no acute distress [Edema] : no peripheral edema [Respiration, Rhythm And Depth] : normal respiratory rhythm and effort [Exaggerated Use Of Accessory Muscles For Inspiration] : no accessory muscle use [Abdomen Soft] : soft [Abdomen Tenderness] : non-tender [Costovertebral Angle Tenderness] : no ~M costovertebral angle tenderness [Urethral Meatus] : meatus normal [Urinary Bladder Findings] : the bladder was normal on palpation [Scrotum] : the scrotum was normal [Testes Mass (___cm)] : there were no testicular masses [No Prostate Nodules] : no prostate nodules [] : no rash [No Focal Deficits] : no focal deficits [Affect] : the affect was normal [Mood] : the mood was normal [Not Anxious] : not anxious [No Palpable Adenopathy] : no palpable adenopathy [Penis Abnormality] : normal circumcised penis [Epididymis] : the epididymides were normal [Testes Tenderness] : no tenderness of the testes [Anus Abnormality] : the anus and perineum were normal [Rectal Exam - Rectum] : digital rectal exam was normal [Prostate Enlargement] : the prostate was not enlarged [Prostate Tenderness] : the prostate was not tender [Prostate Size ___ (0-4)] : prostate size [unfilled] (scale: 0-4) [FreeTextEntry1] : wheelchair bound

## 2020-08-05 NOTE — ADDENDUM
[FreeTextEntry1] : I, Nisreen Innocent, acted solely as a scribe for Dr. Jefferson Agosto on this date, 08/05/2020.\par \par All medical record entries made by the Scribe were at my Dr. Jefferson Agosto direction and personally dictated by me on, 08/05/2020.. I have reviewed the chart and agree that the record accurately reflects my personal performance of the history, physical exam, assessment and plan. I have also personally directed, reviewed and agreed with the chart.\par

## 2020-08-20 ENCOUNTER — APPOINTMENT (OUTPATIENT)
Dept: UROLOGY | Facility: CLINIC | Age: 65
End: 2020-08-20

## 2020-09-22 ENCOUNTER — APPOINTMENT (OUTPATIENT)
Dept: UROLOGY | Facility: CLINIC | Age: 65
End: 2020-09-22
Payer: MEDICARE

## 2020-09-22 VITALS
TEMPERATURE: 97.7 F | HEART RATE: 65 BPM | DIASTOLIC BLOOD PRESSURE: 74 MMHG | WEIGHT: 150 LBS | HEIGHT: 65 IN | SYSTOLIC BLOOD PRESSURE: 104 MMHG | BODY MASS INDEX: 24.99 KG/M2 | OXYGEN SATURATION: 94 %

## 2020-09-22 PROCEDURE — 51728 CYSTOMETROGRAM W/VP: CPT

## 2020-09-22 PROCEDURE — 51797 INTRAABDOMINAL PRESSURE TEST: CPT

## 2020-09-22 PROCEDURE — 51741 ELECTRO-UROFLOWMETRY FIRST: CPT

## 2020-09-22 PROCEDURE — 99213 OFFICE O/P EST LOW 20 MIN: CPT | Mod: 25

## 2020-09-22 PROCEDURE — 51784 ANAL/URINARY MUSCLE STUDY: CPT

## 2020-09-22 NOTE — HISTORY OF PRESENT ILLNESS
[FreeTextEntry1] : 66 y/o male presenting for initial visit for on and off elevated PSA and occasional incontinence. Pt has h/o UTI.\par \par  cc. \par \par Recurrent UTI maybe secondary to incomplete emptying of bladder. Recurrent increased PSA could be secondary to UTIs. \par \par repeat PSA, get UA and culture \par will review past PSAs, review of psychotropic Meds, DM and enlarged prostate. Will schedule for urodynamics to evaluate detrusor function. \par If not on Flomax , start on 0.4 mg PO daily \par \par Follow up in one week.\par \par 09/22/2020: Pt is here today for UDS. Bladder pressure is 76 cm of water. Residual urine secondary to bladder obstruction\par On Tamsulosin will continue.Will start on Finasteride\par Will reevaluate in one month\par If no improvement will need prostate procedure.

## 2020-09-22 NOTE — PHYSICAL EXAM
[General Appearance - Well Developed] : well developed [General Appearance - Well Nourished] : well nourished [Normal Appearance] : normal appearance [Well Groomed] : well groomed [General Appearance - In No Acute Distress] : no acute distress [Abdomen Soft] : soft [Abdomen Tenderness] : non-tender [Costovertebral Angle Tenderness] : no ~M costovertebral angle tenderness [Urethral Meatus] : meatus normal [Penis Abnormality] : normal circumcised penis [Urinary Bladder Findings] : the bladder was normal on palpation [Scrotum] : the scrotum was normal [Epididymis] : the epididymides were normal [Testes Tenderness] : no tenderness of the testes [Testes Mass (___cm)] : there were no testicular masses [Anus Abnormality] : the anus and perineum were normal [Rectal Exam - Rectum] : digital rectal exam was normal [Prostate Enlargement] : the prostate was not enlarged [Prostate Tenderness] : the prostate was not tender [No Prostate Nodules] : no prostate nodules [Prostate Size ___ (0-4)] : prostate size [unfilled] (scale: 0-4) [Edema] : no peripheral edema [] : no respiratory distress [Respiration, Rhythm And Depth] : normal respiratory rhythm and effort [Exaggerated Use Of Accessory Muscles For Inspiration] : no accessory muscle use [Affect] : the affect was normal [Mood] : the mood was normal [Not Anxious] : not anxious [No Focal Deficits] : no focal deficits [No Palpable Adenopathy] : no palpable adenopathy [FreeTextEntry1] : wheelchair bound

## 2020-09-22 NOTE — ADDENDUM
[FreeTextEntry1] : I, Nisreen Innocent, acted solely as a scribe for Dr. Jefferson Agosto on this date, 09/22/2020.\par \par All medical record entries made by the Scribe were at my Dr. Jefferson Agosto direction and personally dictated by me on, 09/22/2020.. I have reviewed the chart and agree that the record accurately reflects my personal performance of the history, physical exam, assessment and plan. I have also personally directed, reviewed and agreed with the chart.\par

## 2020-09-28 ENCOUNTER — OUTPATIENT (OUTPATIENT)
Dept: OUTPATIENT SERVICES | Facility: HOSPITAL | Age: 65
LOS: 1 days | End: 2020-09-28
Payer: COMMERCIAL

## 2020-09-28 ENCOUNTER — EMERGENCY (EMERGENCY)
Facility: HOSPITAL | Age: 65
LOS: 1 days | Discharge: HOME | End: 2020-09-28
Attending: STUDENT IN AN ORGANIZED HEALTH CARE EDUCATION/TRAINING PROGRAM
Payer: COMMERCIAL

## 2020-09-28 VITALS
TEMPERATURE: 100 F | RESPIRATION RATE: 18 BRPM | DIASTOLIC BLOOD PRESSURE: 65 MMHG | HEIGHT: 70 IN | HEART RATE: 96 BPM | SYSTOLIC BLOOD PRESSURE: 99 MMHG | OXYGEN SATURATION: 98 % | WEIGHT: 143.3 LBS

## 2020-09-28 DIAGNOSIS — F20.9 SCHIZOPHRENIA, UNSPECIFIED: ICD-10-CM

## 2020-09-28 LAB
ALBUMIN SERPL ELPH-MCNC: 3.8 G/DL — SIGNIFICANT CHANGE UP (ref 3.3–5.2)
ALP SERPL-CCNC: 122 U/L — HIGH (ref 40–120)
ALT FLD-CCNC: 10 U/L — SIGNIFICANT CHANGE UP
ANION GAP SERPL CALC-SCNC: 11 MMOL/L — SIGNIFICANT CHANGE UP (ref 5–17)
APPEARANCE UR: CLEAR — SIGNIFICANT CHANGE UP
APTT BLD: 26.7 SEC — LOW (ref 27.5–35.5)
AST SERPL-CCNC: 23 U/L — SIGNIFICANT CHANGE UP
BACTERIA # UR AUTO: ABNORMAL
BASOPHILS # BLD AUTO: 0.01 K/UL — SIGNIFICANT CHANGE UP (ref 0–0.2)
BASOPHILS NFR BLD AUTO: 0.1 % — SIGNIFICANT CHANGE UP (ref 0–2)
BILIRUB SERPL-MCNC: 0.4 MG/DL — SIGNIFICANT CHANGE UP (ref 0.4–2)
BILIRUB UR-MCNC: NEGATIVE — SIGNIFICANT CHANGE UP
BUN SERPL-MCNC: 16 MG/DL — SIGNIFICANT CHANGE UP (ref 8–20)
CALCIUM SERPL-MCNC: 9 MG/DL — SIGNIFICANT CHANGE UP (ref 8.6–10.2)
CHLORIDE SERPL-SCNC: 102 MMOL/L — SIGNIFICANT CHANGE UP (ref 98–107)
CO2 SERPL-SCNC: 24 MMOL/L — SIGNIFICANT CHANGE UP (ref 22–29)
COLOR SPEC: YELLOW — SIGNIFICANT CHANGE UP
CREAT SERPL-MCNC: 1.03 MG/DL — SIGNIFICANT CHANGE UP (ref 0.5–1.3)
DIFF PNL FLD: ABNORMAL
EOSINOPHIL # BLD AUTO: 0 K/UL — SIGNIFICANT CHANGE UP (ref 0–0.5)
EOSINOPHIL NFR BLD AUTO: 0 % — SIGNIFICANT CHANGE UP (ref 0–6)
EPI CELLS # UR: SIGNIFICANT CHANGE UP
GLUCOSE SERPL-MCNC: 111 MG/DL — HIGH (ref 70–99)
GLUCOSE UR QL: NEGATIVE — SIGNIFICANT CHANGE UP
HCT VFR BLD CALC: 39.2 % — SIGNIFICANT CHANGE UP (ref 39–50)
HGB BLD-MCNC: 12.9 G/DL — LOW (ref 13–17)
IMM GRANULOCYTES NFR BLD AUTO: 0.5 % — SIGNIFICANT CHANGE UP (ref 0–1.5)
INR BLD: 1.11 RATIO — SIGNIFICANT CHANGE UP (ref 0.88–1.16)
KETONES UR-MCNC: NEGATIVE — SIGNIFICANT CHANGE UP
LACTATE BLDV-MCNC: 1 MMOL/L — SIGNIFICANT CHANGE UP (ref 0.5–2)
LEUKOCYTE ESTERASE UR-ACNC: ABNORMAL
LYMPHOCYTES # BLD AUTO: 0.17 K/UL — LOW (ref 1–3.3)
LYMPHOCYTES # BLD AUTO: 2.3 % — LOW (ref 13–44)
MCHC RBC-ENTMCNC: 32.1 PG — SIGNIFICANT CHANGE UP (ref 27–34)
MCHC RBC-ENTMCNC: 32.9 GM/DL — SIGNIFICANT CHANGE UP (ref 32–36)
MCV RBC AUTO: 97.5 FL — SIGNIFICANT CHANGE UP (ref 80–100)
MONOCYTES # BLD AUTO: 0.31 K/UL — SIGNIFICANT CHANGE UP (ref 0–0.9)
MONOCYTES NFR BLD AUTO: 4.2 % — SIGNIFICANT CHANGE UP (ref 2–14)
NEUTROPHILS # BLD AUTO: 6.78 K/UL — SIGNIFICANT CHANGE UP (ref 1.8–7.4)
NEUTROPHILS NFR BLD AUTO: 92.9 % — HIGH (ref 43–77)
NITRITE UR-MCNC: POSITIVE
PH UR: 7 — SIGNIFICANT CHANGE UP (ref 5–8)
PLATELET # BLD AUTO: 146 K/UL — LOW (ref 150–400)
POTASSIUM SERPL-MCNC: 4.6 MMOL/L — SIGNIFICANT CHANGE UP (ref 3.5–5.3)
POTASSIUM SERPL-SCNC: 4.6 MMOL/L — SIGNIFICANT CHANGE UP (ref 3.5–5.3)
PROT SERPL-MCNC: 7.1 G/DL — SIGNIFICANT CHANGE UP (ref 6.6–8.7)
PROT UR-MCNC: 30 MG/DL
PROTHROM AB SERPL-ACNC: 12.8 SEC — SIGNIFICANT CHANGE UP (ref 10.6–13.6)
RBC # BLD: 4.02 M/UL — LOW (ref 4.2–5.8)
RBC # FLD: 12.3 % — SIGNIFICANT CHANGE UP (ref 10.3–14.5)
RBC CASTS # UR COMP ASSIST: ABNORMAL /HPF (ref 0–4)
SODIUM SERPL-SCNC: 137 MMOL/L — SIGNIFICANT CHANGE UP (ref 135–145)
SP GR SPEC: 1 — LOW (ref 1.01–1.02)
UROBILINOGEN FLD QL: NEGATIVE — SIGNIFICANT CHANGE UP
WBC # BLD: 7.31 K/UL — SIGNIFICANT CHANGE UP (ref 3.8–10.5)
WBC # FLD AUTO: 7.31 K/UL — SIGNIFICANT CHANGE UP (ref 3.8–10.5)
WBC UR QL: ABNORMAL

## 2020-09-28 PROCEDURE — 71045 X-RAY EXAM CHEST 1 VIEW: CPT | Mod: 26

## 2020-09-28 PROCEDURE — 99218: CPT

## 2020-09-28 PROCEDURE — 93010 ELECTROCARDIOGRAM REPORT: CPT

## 2020-09-28 PROCEDURE — 85025 COMPLETE CBC W/AUTO DIFF WBC: CPT

## 2020-09-28 PROCEDURE — 80048 BASIC METABOLIC PNL TOTAL CA: CPT

## 2020-09-28 PROCEDURE — 84450 TRANSFERASE (AST) (SGOT): CPT

## 2020-09-28 PROCEDURE — 36415 COLL VENOUS BLD VENIPUNCTURE: CPT

## 2020-09-28 RX ORDER — CEFTRIAXONE 500 MG/1
1000 INJECTION, POWDER, FOR SOLUTION INTRAMUSCULAR; INTRAVENOUS ONCE
Refills: 0 | Status: COMPLETED | OUTPATIENT
Start: 2020-09-28 | End: 2020-09-28

## 2020-09-28 RX ORDER — SODIUM CHLORIDE 9 MG/ML
2600 INJECTION, SOLUTION INTRAVENOUS ONCE
Refills: 0 | Status: COMPLETED | OUTPATIENT
Start: 2020-09-28 | End: 2020-09-28

## 2020-09-28 RX ORDER — KETOROLAC TROMETHAMINE 30 MG/ML
15 SYRINGE (ML) INJECTION ONCE
Refills: 0 | Status: DISCONTINUED | OUTPATIENT
Start: 2020-09-28 | End: 2020-09-28

## 2020-09-28 RX ORDER — IOHEXOL 300 MG/ML
30 INJECTION, SOLUTION INTRAVENOUS ONCE
Refills: 0 | Status: COMPLETED | OUTPATIENT
Start: 2020-09-28 | End: 2020-09-28

## 2020-09-28 RX ORDER — SODIUM CHLORIDE 9 MG/ML
3 INJECTION INTRAMUSCULAR; INTRAVENOUS; SUBCUTANEOUS ONCE
Refills: 0 | Status: COMPLETED | OUTPATIENT
Start: 2020-09-28 | End: 2020-09-28

## 2020-09-28 RX ADMIN — IOHEXOL 30 MILLILITER(S): 300 INJECTION, SOLUTION INTRAVENOUS at 22:20

## 2020-09-28 RX ADMIN — Medication 15 MILLIGRAM(S): at 22:53

## 2020-09-28 RX ADMIN — CEFTRIAXONE 1000 MILLIGRAM(S): 500 INJECTION, POWDER, FOR SOLUTION INTRAMUSCULAR; INTRAVENOUS at 23:00

## 2020-09-28 RX ADMIN — SODIUM CHLORIDE 3 MILLILITER(S): 9 INJECTION INTRAMUSCULAR; INTRAVENOUS; SUBCUTANEOUS at 22:26

## 2020-09-28 RX ADMIN — SODIUM CHLORIDE 9999 MILLILITER(S): 9 INJECTION, SOLUTION INTRAVENOUS at 22:27

## 2020-09-28 RX ADMIN — CEFTRIAXONE 100 MILLIGRAM(S): 500 INJECTION, POWDER, FOR SOLUTION INTRAMUSCULAR; INTRAVENOUS at 22:26

## 2020-09-28 NOTE — ED PROVIDER NOTE - NEUROLOGICAL, MLM
Alert and oriented, no focal deficits, no motor or sensory deficits. Neck supple and no focal deficit

## 2020-09-28 NOTE — ED PROVIDER NOTE - OBJECTIVE STATEMENT
66 y/o M with PMHx of Schizoaffective disorder, Seizure, osteoporosis presents to the ED c/o vomiting today. Pt reports 4-5 episodes of vomiting. Pt was transferred to [facility] from [facility] and according to pt's document, pt was vomiting and was found to have fever of 102 "urine is pungent smell, also he received flu vaccine today".  Denies abdominal pain

## 2020-09-28 NOTE — ED PROVIDER NOTE - NS ED ATTENDING STATEMENT MOD
Macarena Gold for nexplanon removal.     YENIFER Wolfe LPN   Attending Only

## 2020-09-28 NOTE — ED ADULT NURSE NOTE - NSIMPLEMENTINTERV_GEN_ALL_ED
Implemented All Universal Safety Interventions:  [place] to call system. Call bell, personal items and telephone within reach. Instruct patient to call for assistance. Room bathroom lighting operational. Non-slip footwear when patient is off stretcher. Physically safe environment: no spills, clutter or unnecessary equipment. Stretcher in lowest position, wheels locked, appropriate side rails in place.

## 2020-09-28 NOTE — ED PROVIDER NOTE - CONSTITUTIONAL, MLM
normal... Well appearing, awake, alert, oriented to person, place, time/situation and in no acute distress. Pt is unreliable historian

## 2020-09-28 NOTE — ED ADULT TRIAGE NOTE - CHIEF COMPLAINT QUOTE
patient from [place] with complaints of vomiting, as per ems patient had a fever at [place] received tylenol, denies any complaints of any pain respirations even and unlabored

## 2020-09-28 NOTE — ED ADULT NURSE NOTE - OBJECTIVE STATEMENT
66yo male BIBA alert and calm with [facility] aide at bedside. pt alert and oriented to self, known medical hx of schizoaffective d/o. pt is poor historian, expresses feeling nauseous. pt not vomiting at this time. [facility] aide at bedside states, "he got a flu shot earlier today. then later in the day he started vomiting with a fever." pt received Tylenol prior to arrival. pt NSR on cardiac monitor and continuous pulse ox 95% on room air. respirations even and unlabored. no s/sx of discomfort noted. abd soft and nondistended. no guarding noted. skin WNL for race.

## 2020-09-28 NOTE — ED ADULT NURSE NOTE - CHIEF COMPLAINT QUOTE
patient from [place] with complaints of vomiting, as per ems patient had a fever at [place] received tylenol, denies any complaints of any pain respirations even and unlabored

## 2020-09-28 NOTE — ED PROVIDER NOTE - PROGRESS NOTE DETAILS
Pt requires IV contrast for CT abd/pelvis CT results as noted.  Will place on OBS for IV Abx, po challenge and re-eval for possible d/c back to [place] after pt is fever free for 24 hrs

## 2020-09-29 VITALS
TEMPERATURE: 99 F | SYSTOLIC BLOOD PRESSURE: 122 MMHG | RESPIRATION RATE: 18 BRPM | DIASTOLIC BLOOD PRESSURE: 63 MMHG | OXYGEN SATURATION: 97 % | HEART RATE: 90 BPM

## 2020-09-29 DIAGNOSIS — Z98.49 CATARACT EXTRACTION STATUS, UNSPECIFIED EYE: Chronic | ICD-10-CM

## 2020-09-29 LAB
RAPID RVP RESULT: SIGNIFICANT CHANGE UP
SARS-COV-2 RNA SPEC QL NAA+PROBE: SIGNIFICANT CHANGE UP

## 2020-09-29 PROCEDURE — 99284 EMERGENCY DEPT VISIT MOD MDM: CPT | Mod: 25

## 2020-09-29 PROCEDURE — 96366 THER/PROPH/DIAG IV INF ADDON: CPT

## 2020-09-29 PROCEDURE — 96375 TX/PRO/DX INJ NEW DRUG ADDON: CPT

## 2020-09-29 PROCEDURE — 36415 COLL VENOUS BLD VENIPUNCTURE: CPT

## 2020-09-29 PROCEDURE — 80053 COMPREHEN METABOLIC PANEL: CPT

## 2020-09-29 PROCEDURE — 85610 PROTHROMBIN TIME: CPT

## 2020-09-29 PROCEDURE — 96361 HYDRATE IV INFUSION ADD-ON: CPT

## 2020-09-29 PROCEDURE — 74177 CT ABD & PELVIS W/CONTRAST: CPT

## 2020-09-29 PROCEDURE — 71045 X-RAY EXAM CHEST 1 VIEW: CPT

## 2020-09-29 PROCEDURE — 83605 ASSAY OF LACTIC ACID: CPT

## 2020-09-29 PROCEDURE — 0225U NFCT DS DNA&RNA 21 SARSCOV2: CPT

## 2020-09-29 PROCEDURE — 81001 URINALYSIS AUTO W/SCOPE: CPT

## 2020-09-29 PROCEDURE — 74177 CT ABD & PELVIS W/CONTRAST: CPT | Mod: 26

## 2020-09-29 PROCEDURE — 85730 THROMBOPLASTIN TIME PARTIAL: CPT

## 2020-09-29 PROCEDURE — 87040 BLOOD CULTURE FOR BACTERIA: CPT

## 2020-09-29 PROCEDURE — 87086 URINE CULTURE/COLONY COUNT: CPT

## 2020-09-29 PROCEDURE — 85025 COMPLETE CBC W/AUTO DIFF WBC: CPT

## 2020-09-29 PROCEDURE — G0378: CPT

## 2020-09-29 PROCEDURE — 93005 ELECTROCARDIOGRAM TRACING: CPT

## 2020-09-29 PROCEDURE — 96365 THER/PROPH/DIAG IV INF INIT: CPT | Mod: XU

## 2020-09-29 PROCEDURE — U0003: CPT

## 2020-09-29 RX ORDER — IBUPROFEN 200 MG
600 TABLET ORAL EVERY 6 HOURS
Refills: 0 | Status: DISCONTINUED | OUTPATIENT
Start: 2020-09-29 | End: 2020-10-03

## 2020-09-29 RX ORDER — POLYETHYLENE GLYCOL 3350 17 G/17G
17 POWDER, FOR SOLUTION ORAL DAILY
Refills: 0 | Status: DISCONTINUED | OUTPATIENT
Start: 2020-09-29 | End: 2020-10-03

## 2020-09-29 RX ORDER — FINASTERIDE 5 MG/1
5 TABLET, FILM COATED ORAL DAILY
Refills: 0 | Status: DISCONTINUED | OUTPATIENT
Start: 2020-09-29 | End: 2020-10-03

## 2020-09-29 RX ORDER — HALOPERIDOL DECANOATE 100 MG/ML
10 INJECTION INTRAMUSCULAR
Refills: 0 | Status: DISCONTINUED | OUTPATIENT
Start: 2020-09-29 | End: 2020-10-03

## 2020-09-29 RX ORDER — CEFTRIAXONE 500 MG/1
1000 INJECTION, POWDER, FOR SOLUTION INTRAMUSCULAR; INTRAVENOUS EVERY 24 HOURS
Refills: 0 | Status: DISCONTINUED | OUTPATIENT
Start: 2020-09-29 | End: 2020-10-03

## 2020-09-29 RX ORDER — FAMOTIDINE 10 MG/ML
20 INJECTION INTRAVENOUS DAILY
Refills: 0 | Status: DISCONTINUED | OUTPATIENT
Start: 2020-09-29 | End: 2020-10-03

## 2020-09-29 RX ORDER — BENZTROPINE MESYLATE 1 MG
1 TABLET ORAL DAILY
Refills: 0 | Status: DISCONTINUED | OUTPATIENT
Start: 2020-09-29 | End: 2020-10-03

## 2020-09-29 RX ORDER — LITHIUM CARBONATE 300 MG/1
0 TABLET, EXTENDED RELEASE ORAL
Qty: 0 | Refills: 0 | DISCHARGE

## 2020-09-29 RX ORDER — LITHIUM CARBONATE 300 MG/1
450 TABLET, EXTENDED RELEASE ORAL DAILY
Refills: 0 | Status: DISCONTINUED | OUTPATIENT
Start: 2020-09-29 | End: 2020-10-03

## 2020-09-29 RX ORDER — ACETAMINOPHEN 500 MG
650 TABLET ORAL EVERY 6 HOURS
Refills: 0 | Status: DISCONTINUED | OUTPATIENT
Start: 2020-09-29 | End: 2020-10-03

## 2020-09-29 RX ORDER — HALOPERIDOL DECANOATE 100 MG/ML
1 INJECTION INTRAMUSCULAR
Qty: 0 | Refills: 0 | DISCHARGE

## 2020-09-29 RX ORDER — LITHIUM CARBONATE 300 MG/1
300 TABLET, EXTENDED RELEASE ORAL AT BEDTIME
Refills: 0 | Status: DISCONTINUED | OUTPATIENT
Start: 2020-09-29 | End: 2020-10-03

## 2020-09-29 RX ORDER — TAMSULOSIN HYDROCHLORIDE 0.4 MG/1
0.4 CAPSULE ORAL AT BEDTIME
Refills: 0 | Status: DISCONTINUED | OUTPATIENT
Start: 2020-09-29 | End: 2020-10-03

## 2020-09-29 RX ORDER — LAMOTRIGINE 25 MG/1
200 TABLET, ORALLY DISINTEGRATING ORAL
Refills: 0 | Status: DISCONTINUED | OUTPATIENT
Start: 2020-09-29 | End: 2020-10-03

## 2020-09-29 RX ADMIN — CEFTRIAXONE 100 MILLIGRAM(S): 500 INJECTION, POWDER, FOR SOLUTION INTRAMUSCULAR; INTRAVENOUS at 10:01

## 2020-09-29 RX ADMIN — CEFTRIAXONE 1000 MILLIGRAM(S): 500 INJECTION, POWDER, FOR SOLUTION INTRAMUSCULAR; INTRAVENOUS at 11:41

## 2020-09-29 RX ADMIN — SODIUM CHLORIDE 2600 MILLILITER(S): 9 INJECTION, SOLUTION INTRAVENOUS at 00:00

## 2020-09-29 RX ADMIN — HALOPERIDOL DECANOATE 10 MILLIGRAM(S): 100 INJECTION INTRAMUSCULAR at 06:27

## 2020-09-29 RX ADMIN — LAMOTRIGINE 200 MILLIGRAM(S): 25 TABLET, ORALLY DISINTEGRATING ORAL at 06:27

## 2020-09-29 RX ADMIN — Medication 15 MILLIGRAM(S): at 00:07

## 2020-09-29 NOTE — ED CDU PROVIDER DISPOSITION NOTE - NSFOLLOWUPCLINICS_GEN_ALL_ED_FT
[facility] Acute Care Surgery  Acute Care Surgery  [address] [place] [id number]  Phone: [phone]  Fax:   Follow Up Time:

## 2020-09-29 NOTE — ED ADULT NURSE REASSESSMENT NOTE - NS ED NURSE REASSESS COMMENT FT1
pt denies any nausea/vomiting or discomfort at this time. Vital Signs Stable. pt tolerated applesauce with AM medications well. [facility] aide remains at bedside with pt. pt and aide reeducated on observation dispo and updated plan of care. safety maintained.

## 2020-09-29 NOTE — ED CDU PROVIDER INITIAL DAY NOTE - PMH
Aggressive behavior    Aspiration pneumonia    BPH (benign prostatic hyperplasia)    Colonic polyp    Constipation    COVID-19    Hiatal hernia    Intellectual disability    Mitral valve regurgitation    Osteoporosis    Recurrent UTI    Schizoaffective disorder    Seizure    Unsteady gait    Vitamin D deficiency

## 2020-09-29 NOTE — CHART NOTE - NSCHARTNOTEFT_GEN_A_CORE
SOCIAL WORK NOTE: RECEIVED NOTIFICATION FROM OBS PA THAT PATIENT IS MEDICALLY CLEARED FOR TRANSFER BACK TO [facility].  MD TO MD WAS COMPLETED AN DR. ALVAREZ ACCEPTING THE PATIENT BACK TO [facility] [address] TODAY.  AMBULANCE ARRANGED FOR THE ABOVE AT 1;30 PM.

## 2020-09-29 NOTE — ED CDU PROVIDER DISPOSITION NOTE - NSFOLLOWUPINSTRUCTIONS_ED_ALL_ED_FT
-Take Vantin 200mg bid x 7 days   -Zofran 4mg q8 PRN for nausea/ vomiting  -Take motrin/ Tylenol as needed for pain and fever   -Follow up with general surgery regarding hiatal hernia       Urinary Tract Infection    A urinary tract infection (UTI) is an infection of any part of the urinary tract, which includes the kidneys, ureters, bladder, and urethra. Risk factors include ignoring your need to urinate, wiping back to front if female, being an uncircumcised male, and having diabetes or a weak immune system. Symptoms include frequent urination, pain or burning with urination, foul smelling urine, cloudy urine, pain in the lower abdomen, blood in the urine, and fever. If you were prescribed an antibiotic medicine, take it as told by your health care provider. Do not stop taking the antibiotic even if you start to feel better.    SEEK IMMEDIATE MEDICAL CARE IF YOU HAVE ANY OF THE FOLLOWING SYMPTOMS: severe back or abdominal pain, fever, inability to keep fluids or medicine down, dizziness/lightheadedness, or a change in mental status.

## 2020-09-29 NOTE — ED CDU PROVIDER DISPOSITION NOTE - ATTENDING CONTRIBUTION TO CARE
65 YOM  PMHx of Schizoaffective disorder, Seizure, osteoporosis presents to the ED c/o vomiting today. Pt reports 4-5 episodes of vomiting. Found to have UTI, started on IV abx. CT with incidental finding of hiatal hernia. Pt feeling better, tolerating PO this morning. COVID negative. Start on PO abx. Plan discussed with [facility]

## 2020-09-29 NOTE — ED CDU PROVIDER DISPOSITION NOTE - PATIENT PORTAL LINK FT
You can access the FollowMyHealth Patient Portal offered by [facility] by registering at the following website: [email or web address]. By joining [employer]’s FollowMyHealth portal, you will also be able to view your health information using other applications (apps) compatible with our system.

## 2020-09-29 NOTE — ED CDU PROVIDER DISPOSITION NOTE - CLINICAL COURSE
64 y/o M with PMHx of Schizoaffective disorder, Seizure, osteoporosis presents to the ED c/o vomiting today. Pt reports 4-5 episodes of vomiting. Pt was transferred to [facility] from [facility] and according to pt's document, pt was vomiting and was found to have fever of 102. Urine + for UTI. Pt given IVF, and 1g Rocephin. CT abd with UTI, and incidental finding of hiatal hernia. Pt feeling better, tolerating PO, VS nl. Pt with no fever overnight while in ED. Spoke with Dr. Gallagher at [facility] regarding results and necessity of abx for UTI. Return precautions provided. Covid neg.

## 2020-09-29 NOTE — ED CDU PROVIDER INITIAL DAY NOTE - PROGRESS NOTE DETAILS
Spoke with Dr. Gallagher from [facility]. Discussed results of UTI and incidental finding of hiatal hernia. States they are able to give abx in facility, no need to send abx. Advised to give Vantin 200mg bid x 7 days. Return precautions provided. COVID neg. Pt to go back to [place].

## 2020-09-29 NOTE — ED CDU PROVIDER INITIAL DAY NOTE - MEDICAL DECISION MAKING DETAILS
66 yo male PMHx of Schizoaffective disorder, Seizure, osteoporosis, MVR, intellectual disability, recurrent UTI, BPH, aggressive behavior, previous COVID presents to the ED BIBA from [place] accompanied by staff c/o vomiting, fever, and foul smelling urine x1 day. Patient found to have UTI, no WBC, CT normal. Patient placed in CDU for IV abx, PO challenge, monitor for afebrile to return to [place]. 64 yo male PMHx of Schizoaffective disorder, Seizure, osteoporosis, MVR, intellectual disability, recurrent UTI, BPH, aggressive behavior, previous COVID presents to the ED BIBA from [place] accompanied by staff c/o vomiting, fever, and foul smelling urine x1 day. Patient found to have UTI, no WBC, CT normal. Patient placed in CDU for IV abx, PO challenge, monitor for afebrile to return to [place]. Plan discussed with [place] staff including recommendation for endoscopy follow up.

## 2020-09-29 NOTE — ED CDU PROVIDER INITIAL DAY NOTE - OBJECTIVE STATEMENT
66 yo male PMHx of Schizoaffective disorder, Seizure, osteoporosis, MVR, intellectual disability, recurrent UTI, BPH, aggressive behavior, previous COVID presents to the ED BIBA from [place] accompanied by staff c/o vomiting today. Staff reports 4-5 episodes of NBNB vomiting. Patient transferred to [facility]; documentation reports episodes of emesis, fever Tmax 102F and "urine is pungent smell, also he received flu vaccine today." No further complaints at this time.   Denies abdominal pain 66 yo male PMHx of Schizoaffective disorder, Seizure, osteoporosis, MVR, intellectual disability, recurrent UTI, BPH, aggressive behavior, previous COVID presents to the ED BIBA from inpatient [place] accompanied by staff c/o vomiting today. Staff reports 4-5 episodes of NBNB vomiting. Patient transferred to [facility]; documentation reports episodes of emesis, fever Tmax 102F and "urine is pungent smell, also he received flu vaccine today." No further complaints at this time.   Denies abdominal pain

## 2020-09-29 NOTE — ED ADULT NURSE REASSESSMENT NOTE - NS ED NURSE REASSESS COMMENT FT1
Patient received sleeping in cart, patient awakes to verbal stimuli.  Patient baseline mentally as per [facility] aide at bedside.  Patient has no labored breathing, patient is in no acute distress.  Patient on cardiac monitor reads 80 NSR.

## 2020-09-30 LAB
CULTURE RESULTS: NO GROWTH — SIGNIFICANT CHANGE UP
SPECIMEN SOURCE: SIGNIFICANT CHANGE UP

## 2020-10-03 LAB
CULTURE RESULTS: SIGNIFICANT CHANGE UP
CULTURE RESULTS: SIGNIFICANT CHANGE UP
SPECIMEN SOURCE: SIGNIFICANT CHANGE UP
SPECIMEN SOURCE: SIGNIFICANT CHANGE UP

## 2020-10-07 PROBLEM — R26.81 UNSTEADINESS ON FEET: Chronic | Status: ACTIVE | Noted: 2020-09-29

## 2020-10-07 PROBLEM — E55.9 VITAMIN D DEFICIENCY, UNSPECIFIED: Chronic | Status: ACTIVE | Noted: 2020-09-29

## 2020-10-07 PROBLEM — K44.9 DIAPHRAGMATIC HERNIA WITHOUT OBSTRUCTION OR GANGRENE: Chronic | Status: ACTIVE | Noted: 2020-09-29

## 2020-10-07 PROBLEM — F79 UNSPECIFIED INTELLECTUAL DISABILITIES: Chronic | Status: ACTIVE | Noted: 2020-09-29

## 2020-10-07 PROBLEM — R46.89 OTHER SYMPTOMS AND SIGNS INVOLVING APPEARANCE AND BEHAVIOR: Chronic | Status: ACTIVE | Noted: 2020-09-29

## 2020-10-07 PROBLEM — N39.0 URINARY TRACT INFECTION, SITE NOT SPECIFIED: Chronic | Status: ACTIVE | Noted: 2020-09-29

## 2020-10-07 PROBLEM — J69.0 PNEUMONITIS DUE TO INHALATION OF FOOD AND VOMIT: Chronic | Status: ACTIVE | Noted: 2020-09-29

## 2020-10-07 PROBLEM — K59.00 CONSTIPATION, UNSPECIFIED: Chronic | Status: ACTIVE | Noted: 2020-09-29

## 2020-10-07 PROBLEM — M81.0 AGE-RELATED OSTEOPOROSIS WITHOUT CURRENT PATHOLOGICAL FRACTURE: Chronic | Status: ACTIVE | Noted: 2020-09-29

## 2020-10-07 PROBLEM — I34.0 NONRHEUMATIC MITRAL (VALVE) INSUFFICIENCY: Chronic | Status: ACTIVE | Noted: 2020-09-29

## 2020-10-07 PROBLEM — K63.5 POLYP OF COLON: Chronic | Status: ACTIVE | Noted: 2020-09-29

## 2020-10-07 PROBLEM — U07.1 COVID-19: Chronic | Status: ACTIVE | Noted: 2020-09-29

## 2020-10-07 PROBLEM — N40.0 BENIGN PROSTATIC HYPERPLASIA WITHOUT LOWER URINARY TRACT SYMPTOMS: Chronic | Status: ACTIVE | Noted: 2020-09-29

## 2020-10-22 ENCOUNTER — APPOINTMENT (OUTPATIENT)
Dept: UROLOGY | Facility: CLINIC | Age: 65
End: 2020-10-22
Payer: MEDICARE

## 2020-10-22 VITALS — SYSTOLIC BLOOD PRESSURE: 117 MMHG | DIASTOLIC BLOOD PRESSURE: 79 MMHG | TEMPERATURE: 98.2 F | HEART RATE: 78 BPM

## 2020-10-22 DIAGNOSIS — R32 UNSPECIFIED URINARY INCONTINENCE: ICD-10-CM

## 2020-10-22 DIAGNOSIS — F20.9 SCHIZOPHRENIA, UNSPECIFIED: ICD-10-CM

## 2020-10-22 DIAGNOSIS — N40.1 BENIGN PROSTATIC HYPERPLASIA WITH LOWER URINARY TRACT SYMPMS: ICD-10-CM

## 2020-10-22 PROCEDURE — 99213 OFFICE O/P EST LOW 20 MIN: CPT | Mod: 25

## 2020-10-22 PROCEDURE — 51798 US URINE CAPACITY MEASURE: CPT

## 2020-10-22 RX ORDER — SENNOSIDES 8.6 MG TABLETS 8.6 MG/1
8.6 TABLET ORAL
Refills: 0 | Status: DISCONTINUED | COMMUNITY
Start: 2020-06-29 | End: 2020-10-22

## 2020-10-22 NOTE — ADDENDUM
[FreeTextEntry1] : I, Nisreen Innocent, acted solely as a scribe for Dr. Jefferson Agosto on this date, 10/22/2020.\par \par All medical record entries made by the Scribe were at my Dr. Jefferson Agosto direction and personally dictated by me on, 10/22/2020.. I have reviewed the chart and agree that the record accurately reflects my personal performance of the history, physical exam, assessment and plan. I have also personally directed, reviewed and agreed with the chart.\par

## 2020-10-22 NOTE — HISTORY OF PRESENT ILLNESS
[FreeTextEntry1] : 66 y/o male presenting for initial visit for on and off elevated PSA and occasional incontinence. Pt has h/o UTI.\par \par  cc. \par \par Recurrent UTI maybe secondary to incomplete emptying of bladder. Recurrent increased PSA could be secondary to UTIs. \par \par repeat PSA, get UA and culture \par will review past PSAs, review of psychotropic Meds, DM and enlarged prostate. Will schedule for urodynamics to evaluate detrusor function. \par If not on Flomax , start on 0.4 mg PO daily \par \par Follow up in one week.\par \par 09/22/2020: Pt is here today for UDS. Bladder pressure is 76 cm of water. Residual urine secondary to bladder obstruction\par On Tamsulosin will continue.Will start on Finasteride\par Will reevaluate in one month\par If no improvement will need prostate procedure.\par \par 10/22/2020: Pt is here today for follow up for incontinence. Uroflow could not be done as pt is in wheelchair. \par O/E: PVR 90 cc; Last  cc ; much improved\par \par On Tamsulosin and Finasteride; will continue\par No surgery at present\par Reevaluate in 3 months

## 2020-11-24 ENCOUNTER — APPOINTMENT (OUTPATIENT)
Dept: GASTROENTEROLOGY | Facility: CLINIC | Age: 65
End: 2020-11-24
Payer: MEDICARE

## 2020-11-24 VITALS
BODY MASS INDEX: 25.49 KG/M2 | HEIGHT: 65 IN | SYSTOLIC BLOOD PRESSURE: 116 MMHG | TEMPERATURE: 98.5 F | DIASTOLIC BLOOD PRESSURE: 52 MMHG | WEIGHT: 153 LBS

## 2020-11-24 PROCEDURE — 99202 OFFICE O/P NEW SF 15 MIN: CPT

## 2020-11-24 NOTE — HISTORY OF PRESENT ILLNESS
[de-identified] : Wheelchair-bound patient from the [facility] arrived for evaluation for dyspeptic symptoms. He was having some nausea, vomiting. He underwent a CT abdomen, which showed hiatal hernia, and the gastric wall thickening. He is not taking any PPI therapy. He is passing bowel movements regularly. No abdominal discomfort at this time.

## 2020-11-24 NOTE — ASSESSMENT
[FreeTextEntry1] : At this time, I am recommending to start PPI therapy and obtain upper GI series. Due to the patient's comorbidities, I am not recommending any endoscopic work up. If he has no more symptoms, and upper GI series is fairly unremarkable, we will treat him medically.\par \par Karan Benavides MD\par Gastroenterology \par \par

## 2020-11-24 NOTE — PHYSICAL EXAM
[Sclera] : the sclera and conjunctiva were normal [PERRL With Normal Accommodation] : pupils were equal in size, round, and reactive to light [Extraocular Movements] : extraocular movements were intact [Outer Ear] : the ears and nose were normal in appearance [Oropharynx] : the oropharynx was normal [Neck Appearance] : the appearance of the neck was normal [Neck Cervical Mass (___cm)] : no neck mass was observed [Jugular Venous Distention Increased] : there was no jugular-venous distention [Thyroid Diffuse Enlargement] : the thyroid was not enlarged [Thyroid Nodule] : there were no palpable thyroid nodules [Auscultation Breath Sounds / Voice Sounds] : lungs were clear to auscultation bilaterally [FreeTextEntry1] : grade III systolic murmur at apex [Full Pulse] : the pedal pulses are present [Edema] : there was no peripheral edema [Bowel Sounds] : normal bowel sounds [Abdomen Soft] : soft [Abdomen Tenderness] : non-tender [Abdomen Mass (___ Cm)] : no abdominal mass palpated [Abnormal Walk] : normal gait [Nail Clubbing] : no clubbing  or cyanosis of the fingernails [Musculoskeletal - Swelling] : no joint swelling seen [Motor Tone] : muscle strength and tone were normal [Skin Color & Pigmentation] : normal skin color and pigmentation [Skin Turgor] : normal skin turgor [] : no rash [No Focal Deficits] : no focal deficits [Oriented To Time, Place, And Person] : oriented to person, place, and time [Impaired Insight] : insight and judgment were intact [Affect] : the affect was normal

## 2020-12-11 ENCOUNTER — APPOINTMENT (OUTPATIENT)
Dept: CARDIOLOGY | Facility: CLINIC | Age: 65
End: 2020-12-11

## 2021-02-02 ENCOUNTER — APPOINTMENT (OUTPATIENT)
Dept: CARDIOLOGY | Facility: CLINIC | Age: 66
End: 2021-02-02

## 2021-03-01 ENCOUNTER — APPOINTMENT (OUTPATIENT)
Dept: CARDIOLOGY | Facility: CLINIC | Age: 66
End: 2021-03-01
Payer: MEDICARE

## 2021-03-01 PROCEDURE — 93306 TTE W/DOPPLER COMPLETE: CPT

## 2021-03-22 ENCOUNTER — APPOINTMENT (OUTPATIENT)
Dept: CARDIOLOGY | Facility: CLINIC | Age: 66
End: 2021-03-22

## 2021-04-12 ENCOUNTER — APPOINTMENT (OUTPATIENT)
Dept: CARDIOLOGY | Facility: CLINIC | Age: 66
End: 2021-04-12

## 2021-04-16 ENCOUNTER — NON-APPOINTMENT (OUTPATIENT)
Age: 66
End: 2021-04-16

## 2021-04-16 ENCOUNTER — APPOINTMENT (OUTPATIENT)
Dept: CARDIOLOGY | Facility: CLINIC | Age: 66
End: 2021-04-16
Payer: MEDICARE

## 2021-04-16 VITALS
WEIGHT: 128 LBS | HEIGHT: 65 IN | TEMPERATURE: 98.7 F | DIASTOLIC BLOOD PRESSURE: 71 MMHG | BODY MASS INDEX: 21.33 KG/M2 | SYSTOLIC BLOOD PRESSURE: 102 MMHG | HEART RATE: 91 BPM | OXYGEN SATURATION: 95 %

## 2021-04-16 DIAGNOSIS — L85.3 XEROSIS CUTIS: ICD-10-CM

## 2021-04-16 PROCEDURE — 99214 OFFICE O/P EST MOD 30 MIN: CPT

## 2021-04-16 PROCEDURE — 93000 ELECTROCARDIOGRAM COMPLETE: CPT

## 2021-04-16 RX ORDER — LITHIUM CARBONATE 150 MG/1
150 CAPSULE ORAL 3 TIMES DAILY
Refills: 0 | Status: ACTIVE | COMMUNITY
Start: 2021-04-16

## 2021-04-16 RX ORDER — PANTOPRAZOLE 20 MG/1
20 TABLET, DELAYED RELEASE ORAL
Refills: 0 | Status: ACTIVE | COMMUNITY
Start: 2021-04-16

## 2021-04-16 RX ORDER — POLYETHYLENE GLYCOL 3350 17 G/17G
17 POWDER, FOR SOLUTION ORAL DAILY
Refills: 0 | Status: ACTIVE | COMMUNITY
Start: 2021-04-16

## 2021-04-16 RX ORDER — FAMOTIDINE 20 MG/1
20 TABLET, FILM COATED ORAL DAILY
Qty: 30 | Refills: 0 | Status: DISCONTINUED | COMMUNITY
Start: 2020-06-29 | End: 2021-04-16

## 2021-04-16 RX ORDER — SELENIUM SULFIDE 1 %
SHAMPOO TOPICAL
Refills: 0 | Status: DISCONTINUED | COMMUNITY
End: 2021-04-16

## 2021-04-16 RX ORDER — WHITE PETROLATUM 1.75 OZ
OINTMENT TOPICAL DAILY
Refills: 0 | Status: ACTIVE | COMMUNITY
Start: 2021-04-16

## 2021-04-16 RX ORDER — CASTOR OIL
OIL (ML) ORAL
Refills: 0 | Status: DISCONTINUED | COMMUNITY
Start: 2020-06-29 | End: 2021-04-16

## 2021-04-16 NOTE — HISTORY OF PRESENT ILLNESS
[FreeTextEntry1] : The patient is a 65-year-old  male who is here for f/u cardiac consultation, patient is a chronic resident of [facility]. Patient is accompanied by therapy aide Martha Diaz.\par \par Patient has a schizophrenia, cerebral palsy, mild intellectual disability, seizure disoredr, and multiple other medical/psych issues. Patient is not oriented to place, person or time. Patient is unable to provide any medical history. \par \par Patient came to the office for f/u for mitral regurgitation., he was accompanied by an aide. Patient had COVID19 in May 2020.(per sister). Patient is unable to give any history. He appears comfortable, not in distress.\par According to the therapy aide, patient is apparently ambulating in the [facility], she does not report any respiratory difficulty.\par \par ROS: Unable to Obtain\par \par Therapy Aide- Awilda Benjamin, accompanied the patient.

## 2021-04-16 NOTE — ASSESSMENT
[FreeTextEntry1] : Summary:\par  1. Left ventricular ejection fraction, by visual estimation, is >75%.\par  2. Normal global left ventricular systolic function.\par  3. Spectral Doppler shows impaired relaxation pattern of left \par ventricular myocardial filling (Grade I diastolic dysfunction).\par  4. Moderate anterior leaflet systolic anterior motion of the mitral \par valve with a resting gradent of 27 mm/Hg .\par  5. Eccentric Mitral regurgitation, directed posteriorly, possible Severe \par MR cannot be ruled out.\par  6. Trace tricuspid regurgitation.\par  7. Mild aortic regurgitation.\par  8. Sclerotic aortic valve with normal opening.\par  9. Trace pulmonic valve regurgitation.\par 10. Turbulent flow noted in LVOT due to ROBERT.\par \par [id number] Christiano Cabello MD, Electronically signed on 11/23/2018 at 1:17:59 \par PM\par \par ECHO 2/2020- LVEF 65-70%. Moderate to Severe MR\par \par EKG-6/29/2020- NSR. Early repolarization.\par \par Echocardiogram March 1, 2021: LVEF 65 to 70%.  Robert with LVOT obstruction visualized, resting LVOT gradient 97 mmHg.  Mild aortic regurgitation.  Moderate to severe mitral regurgitation.\par \par Assessment:\par 1. Probably Severe Mitral Regurgitation-at this point it is difficult to evaluate whether if he has any symptoms from this mitral regurgitation .based on the available history, patient is probably asymptomatic. Patient is also not a surgical candidate for mitral valve surgery\par 2. Multiple Psych/Medical Issues\par \par Recommendations:\par Patient's healthcare proxy is his sister. phone number of his HCP- Sister-Jacey GentileIfzlrfgv-[phone].\par I spoke to his sister I have informed her about Mitral regurgitation natural history, eventually may have CHF, may need Teri Valve Surgery. However patient is not an Ideal candidate for surgery because of his comorbidities. I have informed her that I have not even sent him for Surgical Consultation. I have informed her that she has to make a decision about  Advanced Directive's DNR etc.\par \par I discussed echocardiogram results with the patient's sister there is a LVOT obstruction due to the ROBERT we will be contacting beta-blocker therapy Toprol-XL 25 mg once daily.  She is in agreement with the plan.\par \par Once again I have emphasized to the sister that they need to have advanced directive plan in case if he decompensates from this mitral regurgitation and if he has a cardiac arrest, I have strongly recommended them to informed facility about patient's CODE STATUS.  She verbalized understanding\par \par 1.  Start Toprol-XL 25 mg daily\par 2.  Follow-up in 6 months\par 3.  CODE STATUS to be decided by the patient's sister\par 4.  No plans for any surgical consultation-patient sister is well aware of that

## 2021-04-16 NOTE — PHYSICAL EXAM
[General Appearance - Well Nourished] : well nourished [Normal Conjunctiva] : the conjunctiva exhibited no abnormalities [Normal Oropharynx] : normal oropharynx [Auscultation Breath Sounds / Voice Sounds] : lungs were clear to auscultation bilaterally [Heart Rate And Rhythm] : heart rate and rhythm were normal [Heart Sounds] : normal S1 and S2 [Bowel Sounds] : normal bowel sounds [Abdomen Soft] : soft [Abnormal Walk] : normal gait [Cyanosis, Localized] : no localized cyanosis [FreeTextEntry1] : No JVD

## 2021-04-28 ENCOUNTER — OUTPATIENT (OUTPATIENT)
Dept: OUTPATIENT SERVICES | Facility: HOSPITAL | Age: 66
LOS: 1 days | End: 2021-04-28
Payer: COMMERCIAL

## 2021-04-28 DIAGNOSIS — R13.10 DYSPHAGIA, UNSPECIFIED: ICD-10-CM

## 2021-04-28 DIAGNOSIS — Z98.49 CATARACT EXTRACTION STATUS, UNSPECIFIED EYE: Chronic | ICD-10-CM

## 2021-04-28 PROCEDURE — 74240 X-RAY XM UPR GI TRC 1CNTRST: CPT | Mod: 26

## 2021-04-28 PROCEDURE — 74240 X-RAY XM UPR GI TRC 1CNTRST: CPT

## 2021-08-27 ENCOUNTER — OUTPATIENT (OUTPATIENT)
Dept: OUTPATIENT SERVICES | Facility: HOSPITAL | Age: 66
LOS: 1 days | End: 2021-08-27

## 2021-08-27 ENCOUNTER — APPOINTMENT (OUTPATIENT)
Dept: NEUROLOGY | Facility: CLINIC | Age: 66
End: 2021-08-27
Payer: MEDICARE

## 2021-08-27 VITALS
HEIGHT: 65 IN | DIASTOLIC BLOOD PRESSURE: 68 MMHG | OXYGEN SATURATION: 97 % | TEMPERATURE: 97.8 F | HEART RATE: 50 BPM | SYSTOLIC BLOOD PRESSURE: 105 MMHG

## 2021-08-27 DIAGNOSIS — Z01.818 ENCOUNTER FOR OTHER PREPROCEDURAL EXAMINATION: ICD-10-CM

## 2021-08-27 DIAGNOSIS — F20.5 RESIDUAL SCHIZOPHRENIA: ICD-10-CM

## 2021-08-27 DIAGNOSIS — Z98.49 CATARACT EXTRACTION STATUS, UNSPECIFIED EYE: Chronic | ICD-10-CM

## 2021-08-27 PROCEDURE — 99205 OFFICE O/P NEW HI 60 MIN: CPT

## 2021-08-30 ENCOUNTER — INPATIENT (INPATIENT)
Facility: HOSPITAL | Age: 66
LOS: 1 days | Discharge: PSYCHIATRIC OR SUBSTANCE USE FACILITY | DRG: 72 | End: 2021-09-01
Attending: INTERNAL MEDICINE | Admitting: INTERNAL MEDICINE
Payer: MEDICARE

## 2021-08-30 VITALS — HEART RATE: 57 BPM | DIASTOLIC BLOOD PRESSURE: 76 MMHG | SYSTOLIC BLOOD PRESSURE: 134 MMHG | TEMPERATURE: 98 F

## 2021-08-30 DIAGNOSIS — R56.9 UNSPECIFIED CONVULSIONS: ICD-10-CM

## 2021-08-30 DIAGNOSIS — Z98.49 CATARACT EXTRACTION STATUS, UNSPECIFIED EYE: Chronic | ICD-10-CM

## 2021-08-30 LAB
ANION GAP SERPL CALC-SCNC: 11 MMOL/L — SIGNIFICANT CHANGE UP (ref 5–17)
BUN SERPL-MCNC: 19 MG/DL — SIGNIFICANT CHANGE UP (ref 8–20)
CALCIUM SERPL-MCNC: 10.2 MG/DL — SIGNIFICANT CHANGE UP (ref 8.6–10.2)
CHLORIDE SERPL-SCNC: 104 MMOL/L — SIGNIFICANT CHANGE UP (ref 98–107)
CO2 SERPL-SCNC: 23 MMOL/L — SIGNIFICANT CHANGE UP (ref 22–29)
CREAT SERPL-MCNC: 1.06 MG/DL — SIGNIFICANT CHANGE UP (ref 0.5–1.3)
GLUCOSE SERPL-MCNC: 92 MG/DL — SIGNIFICANT CHANGE UP (ref 70–99)
HCT VFR BLD CALC: 38.9 % — LOW (ref 39–50)
HGB BLD-MCNC: 13 G/DL — SIGNIFICANT CHANGE UP (ref 13–17)
MCHC RBC-ENTMCNC: 31.6 PG — SIGNIFICANT CHANGE UP (ref 27–34)
MCHC RBC-ENTMCNC: 33.4 GM/DL — SIGNIFICANT CHANGE UP (ref 32–36)
MCV RBC AUTO: 94.4 FL — SIGNIFICANT CHANGE UP (ref 80–100)
PCP SPEC-MCNC: SIGNIFICANT CHANGE UP
PLATELET # BLD AUTO: 158 K/UL — SIGNIFICANT CHANGE UP (ref 150–400)
POTASSIUM SERPL-MCNC: 4.6 MMOL/L — SIGNIFICANT CHANGE UP (ref 3.5–5.3)
POTASSIUM SERPL-SCNC: 4.6 MMOL/L — SIGNIFICANT CHANGE UP (ref 3.5–5.3)
RBC # BLD: 4.12 M/UL — LOW (ref 4.2–5.8)
RBC # FLD: 13 % — SIGNIFICANT CHANGE UP (ref 10.3–14.5)
SODIUM SERPL-SCNC: 138 MMOL/L — SIGNIFICANT CHANGE UP (ref 135–145)
WBC # BLD: 5.74 K/UL — SIGNIFICANT CHANGE UP (ref 3.8–10.5)
WBC # FLD AUTO: 5.74 K/UL — SIGNIFICANT CHANGE UP (ref 3.8–10.5)

## 2021-08-30 PROCEDURE — 99233 SBSQ HOSP IP/OBS HIGH 50: CPT

## 2021-08-30 PROCEDURE — 95720 EEG PHY/QHP EA INCR W/VEEG: CPT

## 2021-08-30 PROCEDURE — 99222 1ST HOSP IP/OBS MODERATE 55: CPT | Mod: AI

## 2021-08-30 RX ORDER — OLANZAPINE 15 MG/1
1 TABLET, FILM COATED ORAL
Qty: 0 | Refills: 0 | DISCHARGE

## 2021-08-30 RX ORDER — POLYETHYLENE GLYCOL 3350 17 G/17G
1 POWDER, FOR SOLUTION ORAL
Qty: 0 | Refills: 0 | DISCHARGE

## 2021-08-30 RX ORDER — FAMOTIDINE 10 MG/ML
1 INJECTION INTRAVENOUS
Qty: 0 | Refills: 0 | DISCHARGE

## 2021-08-30 RX ORDER — PANTOPRAZOLE SODIUM 20 MG/1
40 TABLET, DELAYED RELEASE ORAL
Refills: 0 | Status: DISCONTINUED | OUTPATIENT
Start: 2021-08-30 | End: 2021-09-01

## 2021-08-30 RX ORDER — DOCUSATE SODIUM 100 MG
3 CAPSULE ORAL
Qty: 0 | Refills: 0 | DISCHARGE

## 2021-08-30 RX ORDER — DOCUSATE SODIUM 100 MG
1 CAPSULE ORAL
Qty: 0 | Refills: 0 | DISCHARGE

## 2021-08-30 RX ORDER — LITHIUM CARBONATE 300 MG/1
300 TABLET, EXTENDED RELEASE ORAL
Refills: 0 | Status: DISCONTINUED | OUTPATIENT
Start: 2021-08-30 | End: 2021-09-01

## 2021-08-30 RX ORDER — METOPROLOL TARTRATE 50 MG
1 TABLET ORAL
Qty: 0 | Refills: 0 | DISCHARGE

## 2021-08-30 RX ORDER — METOPROLOL TARTRATE 50 MG
25 TABLET ORAL DAILY
Refills: 0 | Status: DISCONTINUED | OUTPATIENT
Start: 2021-08-30 | End: 2021-09-01

## 2021-08-30 RX ORDER — BENZTROPINE MESYLATE 1 MG
1 TABLET ORAL
Qty: 0 | Refills: 0 | DISCHARGE

## 2021-08-30 RX ORDER — CHOLECALCIFEROL (VITAMIN D3) 125 MCG
1 CAPSULE ORAL
Qty: 0 | Refills: 0 | DISCHARGE

## 2021-08-30 RX ORDER — HALOPERIDOL DECANOATE 100 MG/ML
10 INJECTION INTRAMUSCULAR
Refills: 0 | Status: DISCONTINUED | OUTPATIENT
Start: 2021-08-30 | End: 2021-09-01

## 2021-08-30 RX ORDER — OLANZAPINE 15 MG/1
5 TABLET, FILM COATED ORAL
Refills: 0 | Status: DISCONTINUED | OUTPATIENT
Start: 2021-08-30 | End: 2021-09-01

## 2021-08-30 RX ORDER — LITHIUM CARBONATE 300 MG/1
1 TABLET, EXTENDED RELEASE ORAL
Qty: 0 | Refills: 0 | DISCHARGE

## 2021-08-30 RX ORDER — PANTOPRAZOLE SODIUM 20 MG/1
1 TABLET, DELAYED RELEASE ORAL
Qty: 0 | Refills: 0 | DISCHARGE

## 2021-08-30 RX ORDER — TAMSULOSIN HYDROCHLORIDE 0.4 MG/1
0.4 CAPSULE ORAL AT BEDTIME
Refills: 0 | Status: DISCONTINUED | OUTPATIENT
Start: 2021-08-30 | End: 2021-09-01

## 2021-08-30 RX ORDER — HALOPERIDOL DECANOATE 100 MG/ML
1 INJECTION INTRAMUSCULAR
Qty: 0 | Refills: 0 | DISCHARGE

## 2021-08-30 RX ORDER — LITHIUM CARBONATE 300 MG/1
150 TABLET, EXTENDED RELEASE ORAL
Refills: 0 | Status: DISCONTINUED | OUTPATIENT
Start: 2021-08-30 | End: 2021-09-01

## 2021-08-30 RX ORDER — ENOXAPARIN SODIUM 100 MG/ML
40 INJECTION SUBCUTANEOUS DAILY
Refills: 0 | Status: DISCONTINUED | OUTPATIENT
Start: 2021-08-30 | End: 2021-09-01

## 2021-08-30 RX ORDER — BENZTROPINE MESYLATE 1 MG
1 TABLET ORAL DAILY
Refills: 0 | Status: DISCONTINUED | OUTPATIENT
Start: 2021-08-30 | End: 2021-09-01

## 2021-08-30 RX ORDER — POLYETHYLENE GLYCOL 3350 17 G/17G
17 POWDER, FOR SOLUTION ORAL DAILY
Refills: 0 | Status: DISCONTINUED | OUTPATIENT
Start: 2021-08-30 | End: 2021-09-01

## 2021-08-30 RX ORDER — TAMSULOSIN HYDROCHLORIDE 0.4 MG/1
1 CAPSULE ORAL
Qty: 0 | Refills: 0 | DISCHARGE

## 2021-08-30 RX ORDER — LAMOTRIGINE 25 MG/1
1 TABLET, ORALLY DISINTEGRATING ORAL
Qty: 0 | Refills: 0 | DISCHARGE

## 2021-08-30 RX ORDER — FINASTERIDE 5 MG/1
1 TABLET, FILM COATED ORAL
Qty: 0 | Refills: 0 | DISCHARGE

## 2021-08-30 RX ORDER — FINASTERIDE 5 MG/1
5 TABLET, FILM COATED ORAL DAILY
Refills: 0 | Status: DISCONTINUED | OUTPATIENT
Start: 2021-08-30 | End: 2021-09-01

## 2021-08-30 RX ADMIN — HALOPERIDOL DECANOATE 10 MILLIGRAM(S): 100 INJECTION INTRAMUSCULAR at 16:30

## 2021-08-30 RX ADMIN — OLANZAPINE 5 MILLIGRAM(S): 15 TABLET, FILM COATED ORAL at 20:31

## 2021-08-30 RX ADMIN — LITHIUM CARBONATE 150 MILLIGRAM(S): 300 TABLET, EXTENDED RELEASE ORAL at 16:30

## 2021-08-30 RX ADMIN — LITHIUM CARBONATE 300 MILLIGRAM(S): 300 TABLET, EXTENDED RELEASE ORAL at 20:32

## 2021-08-30 RX ADMIN — TAMSULOSIN HYDROCHLORIDE 0.4 MILLIGRAM(S): 0.4 CAPSULE ORAL at 22:13

## 2021-08-30 RX ADMIN — Medication 1 MILLIGRAM(S): at 16:30

## 2021-08-30 NOTE — HISTORY OF PRESENT ILLNESS
[FreeTextEntry1] : PCP: Dr. Jenae Callejas\par Elliott gamblee: Awilda Roberts\par \par This is a 66 year-old RH male with a history of schizophrenia, cerebral palsy, mild intellectual disability, DM, mitral regurgitation who is referred by Elliott for AED management. Pt today is accompanied by Awilda Velasquez. \par \par Limited history as pt unable to provide any relevant information and Awilda doesn’t know pt well. Per Elliott's note, pt had seizure(s) while he was hyponatremic down to 125 on OXC. Last sz on 2/13/2003. EEG 2/21/2007 was without epileptiform discharges. Pt then tapered off of PHT and CBZ and was on LTG and LEV for mood and seizure. No longer taking LTG and LEV at this time.\par \par SEIZURE RISK FACTORS:\par Hyponatremia.\par \par CURRENT AED:\par LZP 1mg BID – for anxiety, impulsivity \par \par PREVIOUS AED:\par VPA – allergic\par LEV\par OXC - hyponatremia\par PHT - off since 2003\par CBZ - off since 2005\par LTG - off since 3/2021\par \par IMAGING: \par CT 12/17/2019 s[facility]p fall ([facility]): Large right frontal scalp/periorbital hematoma.\par \par NEUROPHYSIOLOGY:\par Per Elliott's note, EEG 2/21/2007 without epileptiform discharges. Actual report not available.\par \par NEUROPSYCHOLOGY: \par none\par \par ALLERGIES:\par Chlorpromazine, VPA, PCN

## 2021-08-30 NOTE — ASSESSMENT
[FreeTextEntry1] : MYRA TOLEDO is a 66 year-old RH male with a history of schizophrenia, cerebral palsy, mild intellectual disability, DM, mitral regurgitation, seizure VS epilepsy who presents for AED management. Will admit to Epilepsy Monitoring Unit (EMU) for further workup.\par \par - Admit to EMU 8/30. The purpose of the EMU admission is to establish the first diagnosis of a seizure disorder, which is medically necessary to guide clinical management and select the most appropriate therapeutic regimen. The expected length of stay is 3-4 days.\par \par - will reach out to Dr. Jenae Callejas for collateral information\par - f/u in 3 months\par \par \par Personally reviewed all images, labs and other studies. More than 50% of time spent on counseling and educating patient and aide on differential, workup, disease course, and treatment/management. All questions and concerns answered and addressed in detail to patient's and aide's complete satisfaction. Patient and aide verbalized understanding and agreed to plan.\par

## 2021-08-30 NOTE — H&P ADULT - HISTORY OF PRESENT ILLNESS
The patient is a 66 year old male with a past medical history of schizophrenia, cerebral palsy, hypertension and seizures who presents for continuos vEEG from [facility] to rule out primary seizure disorder.  Patient unable to provide accurate history, according to the chart patient had a witnessed seizure at [facility] while hyponatremic. EEG was done in 2007 which was negative, he was tapered off AEDs.

## 2021-08-30 NOTE — H&P ADULT - NSICDXPASTMEDICALHX_GEN_ALL_CORE_FT
PAST MEDICAL HISTORY:  Aggressive behavior     Aspiration pneumonia     BPH (benign prostatic hyperplasia)     Colonic polyp     Constipation     COVID-19     Hiatal hernia     Intellectual disability     Mitral valve regurgitation     Osteoporosis     Recurrent UTI     Schizoaffective disorder     Seizure     Unsteady gait     Vitamin D deficiency

## 2021-08-30 NOTE — CONSULT NOTE ADULT - SUBJECTIVE AND OBJECTIVE BOX
[facility]                                                                     MD May Nguyen DO                                              Epilepsy Consult #: [id number] ([phone])                                               Office: [address] [place] [address], [address]                                                 Phone: [phone]; Fax: [phone]                            ==============================================    EPILEPSY INITIAL CONSULT NOTE      HPI  This is a 66 year-old RH male with a history of schizophrenia, cerebral palsy, mild intellectual disability, DM, mitral regurgitation, seizures who presents to EMU to establish the first diagnosis of a seizure disorder.    Limited history as pt unable to provide any relevant information. Per Elliott's note, pt had seizure(s) while he was hyponatremic down to 125 on OXC. Last sz on 2/13/2003. EEG 2/21/2007 was without epileptiform discharges. Pt then tapered off of PHT and CBZ and was on LTG and LEV for mood and seizure. No longer taking LTG and LEV at this time. Presents to EMU to evaluate whether pt has a seizure disorder and need to be on AED.     SEIZURE RISK FACTORS:  Hyponatremia.    CURRENT AED:  LZP 1mg BID – for anxiety, impulsivity     PREVIOUS AED:  VPA – allergic  LEV  LTG - tapered off 3/2021    IMAGING:   [facility] 12/17/2019 [facility]p fall ([facility]): Large right frontal scalp/periorbital hematoma.    NEUROPHYSIOLOGY:  Per Elliott's note, EEG 2/21/2007 without epileptiform discharges. Actual report not available.    NEUROPSYCHOLOGY:   None        PAST MEDICAL HISTORY:  No pertinent past medical history    Seizure    Schizoaffective disorder    Aggressive behavior    COVID-19    Mitral valve regurgitation    Colonic polyp    Constipation    Unsteady gait    Osteoporosis    Hiatal hernia    Vitamin D deficiency    Recurrent UTI    BPH (benign prostatic hyperplasia)    Aspiration pneumonia    Intellectual disability      PAST SURGICAL HISTORY:   History of cataract surgery      HOME MEDICATIONS:     ALLERGIES:   Calcium (Other)  Depakote (Other)  penicillin (Other)  Thorazine (Other)    FAMILY HISTORY:  Non-contributory    SOCIAL HISTORY:   No EtOH, tobacco, illicit drugs.    ROS:  Negative for constitutional, skin, eyes, ENT, respiratory, cardiovascular, gastrointestinal, genitourinary, musculoskeletal, neurologic, psychiatric, hematology/lymphatics, endocrine, allergic/immunologic.    VITALS:  T(C): --  HR: --  BP: --  ABP: --  RR: --  SpO2: --  CVP(cm H2O): --    GENERAL PHYSICAL EXAM:  GEN: no distress  HEENT: NCAT, OP clear  EYES: sclera white, conjunctiva clear, no nystagmus  NECK: supple  CV: RRR 		  PULM: CTAB, no wheezing  GI: soft ABD, +BS, NT, ND  EXT: peripheral pulse intact, no cyanosis  MSK: muscle tone and strength normal  SKIN: warm, dry, no rash or lesion on exposed skin     NEUROLOGICAL EXAM:  Mental Status  Orientation: awake and alert, thinks it's 1900s and Sandeep is the president  Language: moderate-severe dysarthria    Cranial Nerves  PERRL, grossly looking in all directions   No facial asymmetry  BL hearing intact     Motor  5/5 in all EXTs    Sensation  Intact to light touch and pinprick in all 4 EXTs    Reflex  2+ in BL brachioradialis, patella     Coordination  Pt refused to cooperate    Gait  Deferred      LABS:   pending                                                                                      [facility]                                                                     MD May Nguyen DO                                              Epilepsy Consult #: [id number] ([phone])                                               Office: [address] [place] [address], [address]                                                 Phone: [phone]; Fax: [phone]                            ==============================================    EPILEPSY INITIAL CONSULT NOTE      HPI  This is a 66 year-old RH male with a history of schizophrenia, cerebral palsy, mild intellectual disability, DM, mitral regurgitation, seizures who presents to EMU to establish the first diagnosis of a seizure disorder.    Limited history as pt unable to provide any relevant information. Per Elliott's note, pt had seizure(s) while he was hyponatremic down to 125 on OXC. Last sz on 2/13/2003. EEG 2/21/2007 was without epileptiform discharges. Pt then tapered off of PHT and CBZ and was on LTG and LEV for mood and seizure. No longer taking LTG and LEV at this time. Presents to EMU to evaluate whether pt has a seizure disorder and need to be on AED.     SEIZURE RISK FACTORS:  Hyponatremia.    CURRENT AED:  LZP 1mg BID – for anxiety, impulsivity     PREVIOUS AEDs:  VPA – allergic  LEV  OXC - hyponatremia  PHT - off since 2003  CBZ - off since 2005  LTG - off since 3/2021    IMAGING:   [facility] 12/17/2019 s[facility]p fall ([facility]): Large right frontal scalp/periorbital hematoma.    NEUROPHYSIOLOGY:  Per Elliott's note, EEG 2/21/2007 without epileptiform discharges. Actual report not available.    NEUROPSYCHOLOGY:   None      PAST MEDICAL HISTORY:  Seizure    Schizoaffective disorder    Aggressive behavior    COVID-19    Mitral valve regurgitation    Colonic polyp    Constipation    Unsteady gait    Osteoporosis    Hiatal hernia    Vitamin D deficiency    Recurrent UTI    BPH (benign prostatic hyperplasia)    Aspiration pneumonia    Intellectual disability      PAST SURGICAL HISTORY:   History of cataract surgery      HOME MEDICATIONS:     ALLERGIES:   Calcium (Other)  Depakote (Other)  penicillin (Other)  Thorazine (Other)    FAMILY HISTORY:  Non-contributory    SOCIAL HISTORY:   No EtOH, tobacco, illicit drugs.    ROS:  Negative for constitutional, skin, eyes, ENT, respiratory, cardiovascular, gastrointestinal, genitourinary, musculoskeletal, neurologic, psychiatric, hematology/lymphatics, endocrine, allergic/immunologic.    VITALS:  T(C): --  HR: --  BP: --  ABP: --  RR: --  SpO2: --  CVP(cm H2O): --    GENERAL PHYSICAL EXAM:  GEN: no distress  HEENT: NCAT, OP clear  EYES: sclera white, conjunctiva clear, no nystagmus  NECK: supple  CV: RRR 		  PULM: CTAB, no wheezing  GI: soft ABD, +BS, NT, ND  EXT: peripheral pulse intact, no cyanosis  MSK: muscle tone and strength normal  SKIN: warm, dry, no rash or lesion on exposed skin     NEUROLOGICAL EXAM:  Mental Status  Orientation: awake and alert, thinks it's 1900s and Sandeep is the president  Language: moderate-severe dysarthria    Cranial Nerves  PERRL, grossly looking in all directions   No facial asymmetry  BL hearing intact     Motor  5/5 in all EXTs    Sensation  Intact to light touch and pinprick in all 4 EXTs    Reflex  2+ in BL brachioradialis, patella     Coordination  Pt refused to cooperate    Gait  Deferred      LABS:   pending                                                                                      [facility]                                                                     MD May Nguyen DO                                              Epilepsy Consult #: [id number] ([phone])                                               Office: [address] [place] [address], [address]                                                 Phone: [phone]; Fax: [phone]                            ==============================================    EPILEPSY INITIAL CONSULT NOTE      HPI  This is a 66 year-old RH male with a history of schizophrenia, cerebral palsy, mild intellectual disability, DM, mitral regurgitation, seizures who presents to EMU to establish the first diagnosis of a seizure disorder.    Limited history as pt unable to provide any relevant information. Per lEliott's note, pt had seizure(s) while he was hyponatremic down to 125 on OXC. Last sz on 2/13/2003. EEG 2/21/2007 was without epileptiform discharges. Pt then tapered off of PHT and CBZ and was on LTG and LEV for mood and seizure. No longer taking LTG and LEV at this time. Presents to EMU to evaluate whether pt has a seizure disorder and need to be on AED.     SEIZURE RISK FACTORS:  Hyponatremia.    CURRENT AED:  LZP 1mg BID – for anxiety, impulsivity     PREVIOUS AEDs:  VPA – allergic  LEV  OXC - hyponatremia  PHT - off since 2003  CBZ - off since 2005  LTG - off since 3/2021    IMAGING:   [facility] 12/17/2019 s[facility]p fall ([facility]): Large right frontal scalp/periorbital hematoma.    NEUROPHYSIOLOGY:  Per Elliott's note, EEG 2/21/2007 without epileptiform discharges. Actual report not available.    NEUROPSYCHOLOGY:   None      PAST MEDICAL HISTORY:  Seizure    Schizoaffective disorder    Aggressive behavior    COVID-19    Mitral valve regurgitation    Colonic polyp    Constipation    Unsteady gait    Osteoporosis    Hiatal hernia    Vitamin D deficiency    Recurrent UTI    BPH (benign prostatic hyperplasia)    Aspiration pneumonia    Intellectual disability      PAST SURGICAL HISTORY:   History of cataract surgery    MEDICATIONS  (STANDING):  benztropine 1 milliGRAM(s) Oral daily  enoxaparin Injectable 40 milliGRAM(s) SubCutaneous daily  finasteride 5 milliGRAM(s) Oral daily  haloperidol     Tablet 10 milliGRAM(s) Oral two times a day  lithium 150 milliGRAM(s) Oral two times a day  lithium 300 milliGRAM(s) Oral <User Schedule>  LORazepam     Tablet 1 milliGRAM(s) Oral <User Schedule>  metoprolol succinate ER 25 milliGRAM(s) Oral daily  OLANZapine 5 milliGRAM(s) Oral <User Schedule>  pantoprazole    Tablet 40 milliGRAM(s) Oral before breakfast  polyethylene glycol 3350 17 Gram(s) Oral daily  tamsulosin 0.4 milliGRAM(s) Oral at bedtime    ALLERGIES:   Calcium (Other)  Depakote (Other)  penicillin (Other)  Thorazine (Other)    FAMILY HISTORY:  Non-contributory    SOCIAL HISTORY:   No EtOH, tobacco, illicit drugs.    ROS:  Negative for constitutional, skin, eyes, ENT, respiratory, cardiovascular, gastrointestinal, genitourinary, musculoskeletal, neurologic, psychiatric, hematology/lymphatics, endocrine, allergic/immunologic.    Vital Signs Last 24 Hrs  T(C): 36.7 (30 Aug 2021 09:15), Max: 36.7 (30 Aug 2021 09:15)  T(F): 98 (30 Aug 2021 09:15), Max: 98 (30 Aug 2021 09:15)  HR: 57 (30 Aug 2021 09:15) (57 - 57)  BP: 134/76 (30 Aug 2021 09:15) (134/76 - 134/76)  BP(mean): --  RR: --  SpO2: --    GENERAL PHYSICAL EXAM:  GEN: no distress  HEENT: NCAT, OP clear  EYES: sclera white, conjunctiva clear, no nystagmus  NECK: supple  CV: RRR 		  PULM: CTAB, no wheezing  GI: soft ABD, +BS, NT, ND  EXT: peripheral pulse intact, no cyanosis  MSK: muscle tone and strength normal  SKIN: warm, dry, no rash or lesion on exposed skin     NEUROLOGICAL EXAM:  Mental Status  Orientation: awake and alert, thinks it's 1900s and Sandeep is the president  Language: moderate-severe dysarthria    Cranial Nerves  PERRL, grossly looking in all directions   No facial asymmetry  BL hearing intact     Motor  5/5 in all EXTs    Sensation  Intact to light touch and pinprick in all 4 EXTs    Reflex  2+ in BL brachioradialis, patella     Coordination  Pt refused to cooperate    Gait  Deferred      LABS:   pending

## 2021-08-30 NOTE — PHYSICAL EXAM
[FreeTextEntry1] : GENERAL PHYSICAL EXAM:\par GEN: no distress\par HEENT: NCAT, OP clear\par EYES: sclera white, conjunctiva clear, no nystagmus\par NECK: supple\par CV: RRR    		\par PULM: CTAB, no wheezing\par GI: soft ABD, +BS, NT, ND\par EXT: peripheral pulse intact, no cyanosis\par MSK: muscle tone and strength normal\par SKIN: warm, dry, no rash or lesion on exposed skin \par \par NEUROLOGICAL EXAM:\par Mental Status\par Orientation: awake and alert, thinks it's 1900s and Sandeep is the president\par Language: moderate-severe dysarthria\par \par Cranial Nerves\par PERRL, grossly looking in all directions \par No facial asymmetry\par BL hearing intact \par \par Motor\par 5/5 in all EXTs\par \par Sensation\par Intact to light touch and pinprick in all 4 EXTs\par \par Reflex\par 2+ in BL brachioradialis, patella                                \par \par Coordination\par Pt refused to cooperate\par \par Gait\par Deferred \par \par

## 2021-08-30 NOTE — H&P ADULT - ASSESSMENT
The patient is a 66 year old male with a past medical history of schizophrenia, cerebral palsy, hypertension and seizures who presents for continuos vEEG from [place] to rule out primary seizure disorder.    Assessment/Plan:    1. Rule out seizure disorder: Seizure precautions  Continuous vEEG  Fall precautions  Dr Pastrana following    2. Schizophrenia: Continue home medications  Confirmed with [place] and reconciled appropriately    3. Hypertension on Metoprolol  BP stable    4. BPH on Flomax and finasteride    VTE Lovenox subcut    Discharge disposition: Return to [place] in 24-48 hours pending vEEG results

## 2021-08-30 NOTE — CONSULT NOTE ADULT - ASSESSMENT
66 year-old RH male with a history of schizophrenia, cerebral palsy, mild intellectual disability, DM, mitral regurgitation, seizures who presents to EMU to establish the first diagnosis of a seizure disorder. Personally reviewed all imagines, labs and other studies.      Recommendation:  - cvEEG  - continue all home meds  - tele monitor  - bed rail up at all times  - bed rail padding  - OOB only with supervision and assist  - seizure precaution      Thank you for allowing Epilepsy to participate in the care of this patient.   ______________________  Filomena Pastrana MD   Director, Epilepsy/EMU - [facility]   Epilepsy Consult #: [id number] ([phone])

## 2021-08-31 ENCOUNTER — TRANSCRIPTION ENCOUNTER (OUTPATIENT)
Age: 66
End: 2021-08-31

## 2021-08-31 LAB
COVID-19 SPIKE DOMAIN AB INTERP: POSITIVE
COVID-19 SPIKE DOMAIN ANTIBODY RESULT: >250 U/ML — HIGH
HCV AB S/CO SERPL IA: 0.14 S/CO — SIGNIFICANT CHANGE UP (ref 0–0.99)
HCV AB SERPL-IMP: SIGNIFICANT CHANGE UP
SARS-COV-2 IGG+IGM SERPL QL IA: >250 U/ML — HIGH
SARS-COV-2 IGG+IGM SERPL QL IA: POSITIVE

## 2021-08-31 PROCEDURE — 99232 SBSQ HOSP IP/OBS MODERATE 35: CPT

## 2021-08-31 PROCEDURE — 99233 SBSQ HOSP IP/OBS HIGH 50: CPT

## 2021-08-31 PROCEDURE — 95720 EEG PHY/QHP EA INCR W/VEEG: CPT

## 2021-08-31 RX ADMIN — ENOXAPARIN SODIUM 40 MILLIGRAM(S): 100 INJECTION SUBCUTANEOUS at 11:18

## 2021-08-31 RX ADMIN — Medication 1 MILLIGRAM(S): at 08:37

## 2021-08-31 RX ADMIN — PANTOPRAZOLE SODIUM 40 MILLIGRAM(S): 20 TABLET, DELAYED RELEASE ORAL at 06:17

## 2021-08-31 RX ADMIN — POLYETHYLENE GLYCOL 3350 17 GRAM(S): 17 POWDER, FOR SOLUTION ORAL at 11:18

## 2021-08-31 RX ADMIN — OLANZAPINE 5 MILLIGRAM(S): 15 TABLET, FILM COATED ORAL at 19:54

## 2021-08-31 RX ADMIN — OLANZAPINE 5 MILLIGRAM(S): 15 TABLET, FILM COATED ORAL at 11:18

## 2021-08-31 RX ADMIN — Medication 25 MILLIGRAM(S): at 08:37

## 2021-08-31 RX ADMIN — FINASTERIDE 5 MILLIGRAM(S): 5 TABLET, FILM COATED ORAL at 11:19

## 2021-08-31 RX ADMIN — Medication 1 MILLIGRAM(S): at 11:19

## 2021-08-31 RX ADMIN — HALOPERIDOL DECANOATE 10 MILLIGRAM(S): 100 INJECTION INTRAMUSCULAR at 16:32

## 2021-08-31 RX ADMIN — LITHIUM CARBONATE 150 MILLIGRAM(S): 300 TABLET, EXTENDED RELEASE ORAL at 17:40

## 2021-08-31 RX ADMIN — HALOPERIDOL DECANOATE 10 MILLIGRAM(S): 100 INJECTION INTRAMUSCULAR at 06:17

## 2021-08-31 RX ADMIN — LITHIUM CARBONATE 300 MILLIGRAM(S): 300 TABLET, EXTENDED RELEASE ORAL at 19:54

## 2021-08-31 RX ADMIN — TAMSULOSIN HYDROCHLORIDE 0.4 MILLIGRAM(S): 0.4 CAPSULE ORAL at 21:46

## 2021-08-31 RX ADMIN — Medication 1 MILLIGRAM(S): at 16:32

## 2021-08-31 RX ADMIN — LITHIUM CARBONATE 150 MILLIGRAM(S): 300 TABLET, EXTENDED RELEASE ORAL at 06:17

## 2021-08-31 NOTE — DISCHARGE NOTE PROVIDER - NSDCCPCAREPLAN_GEN_ALL_CORE_FT
PRINCIPAL DISCHARGE DIAGNOSIS  Diagnosis: Seizure  Assessment and Plan of Treatment: You were admitted for further evaluation of your seizures by Dr. Pastrana. EEG did not show any seizure.  Please continue to follow up with Dr. Pastrana.      SECONDARY DISCHARGE DIAGNOSES  Diagnosis: Schizophrenia  Assessment and Plan of Treatment:     Diagnosis: Hypertension  Assessment and Plan of Treatment:     Diagnosis: BPH (benign prostatic hyperplasia)  Assessment and Plan of Treatment:      PRINCIPAL DISCHARGE DIAGNOSIS  Diagnosis: Seizure  Assessment and Plan of Treatment: You were admitted for possible seizure-like activity and to be moniotred on EEG. no seizure noted on EEG. Please follow up with Dr. Pastrana outpatient      SECONDARY DISCHARGE DIAGNOSES  Diagnosis: Schizophrenia  Assessment and Plan of Treatment:     Diagnosis: Hypertension  Assessment and Plan of Treatment: Continue metoprolol    Diagnosis: BPH (benign prostatic hyperplasia)  Assessment and Plan of Treatment: continue flomax and finasteride

## 2021-08-31 NOTE — DISCHARGE NOTE PROVIDER - NSDCFUSCHEDAPPT_GEN_ALL_CORE_FT
MYRA TOLEDO ; 10/18/2021 ; [facility] Cardio [address]  MYRA TOLEDO ; 11/15/2021 ; [facility] Neurology [address] [facility]

## 2021-08-31 NOTE — DISCHARGE NOTE PROVIDER - CARE PROVIDER_API CALL
Filomena Pastrana)  EEGEpilepsy; Neurology  [address] [place] [address]  Phone: [phone]  Fax: [phone]  Follow Up Time:

## 2021-08-31 NOTE — DISCHARGE NOTE PROVIDER - NSDCMRMEDTOKEN_GEN_ALL_CORE_FT
Ativan 1 mg oral tablet: 1 tab(s) orally 2 times a day  8:45am and 4:30Pm  benztropine 1 mg oral tablet: 1 tab(s) orally once a day  cholecalciferol 50 mcg (2000 intl units) oral tablet: 1 tab(s) orally every other day (at bedtime)  Colace 100 mg oral capsule: 3 cap(s) orally once a day  finasteride 5 mg oral tablet: 1 tab(s) orally once a day  Fleet Enema 19 g-7 g rectal enema: 133 milliliter(s) rectal once, As Needed  Flomax 0.4 mg oral capsule: 1 cap(s) orally once a day  haloperidol 10 mg oral tablet: 1 tab(s) orally 2 times a day  lithium 150 mg oral capsule: 1 cap(s) orally 2 times a day  lithium 300 mg oral capsule: 1 cap(s) orally once a day (at bedtime) at 8:30pm  OLANZapine 5 mg oral tablet: 1 tab(s) orally 2 times a day 1230 and 830p  polyethylene glycol 3350 oral powder for reconstitution: 1 dose(s) orally once a day  Protonix 40 mg oral delayed release tablet: 1 tab(s) orally once a day  Toprol-XL 25 mg oral tablet, extended release: 1 tab(s) orally once a day

## 2021-08-31 NOTE — EEG REPORT - NS EEG TEXT BOX
[facility]  Epilepsy Monitoring Unit Report    [facility]: [address] [place] [address], Phone [phone]  [facility]: [address], [place] [address], Phone [phone]  [place] Office: [address], [address], [place] [address] Phone [phone]    [facility]: [address] [place] [address], Phone [phone]  [place] Office: [address] [place] [address], Phone [phone]    Patient Name: Marvin Gentile    Age: 66 year, : 1955  Patient ID: -, MRN #: -, Shepherd: -    Physician Ordering Inpatient EEG: Dr. Hernandez  Referral Source to EMU: elective admission – Dr. Pastrana    EMU Study Started: 10:28 on 21    EMU Study Ended: pending discharge    Study Information:    EEG Recording Technique:  The patient underwent continuous Video-EEG monitoring, using Telemetry System hardware on the XLTek Digital System. EEG and video data were stored on a computer hard drive with important events saved in digital archive files. The material was reviewed by a physician (electroencephalographer / epileptologist) on a daily basis. Vasquez and seizure detection algorithms were utilized and reviewed. An EEG Technician attended to the patient, and was available throughout daytime work hours.  The epilepsy center neurologist was available in person or on call 24-hours per day.    EEG Placement and Labeling of Electrodes:  The EEG was performed utilizing 20 channel referential EEG connections (coronal over temporal over parasagittal montage) using all standard 10-20 electrode placements with EKG, with additional electrodes placed in the inferior temporal region using the modified 10-10 montage electrode placements for elective admissions, or if deemed necessary. Recording was at a sampling rate of 256 samples per second per channel. Time synchronized digital video recording was done simultaneously with EEG recording. A low light infrared camera was used for low light recording.         History:  This is a 66 year-old  male with a history of schizophrenia, cerebral palsy, mild intellectual disability, DM, mitral regurgitation, seizures who presents to EMU to establish the first diagnosis of a seizure disorder.    Limited history as pt unable to provide any relevant information. Per Elliott's note, pt had seizure(s) while he was hyponatremic down to 125 on OXC. Last sz on 2003. EEG 2007 was without epileptiform discharges. Pt then tapered off of PHT and CBZ and was on LTG and LEV for mood and seizure. No longer taking LTG and LEV at this time. Presents to EMU to evaluate whether pt has a seizure disorder and need to be on AED.     SEIZURE RISK FACTORS:  Hyponatremia.    CURRENT AED:  LZP 1mg BID – for anxiety, impulsivity     PREVIOUS AEDs:  VPA – allergic  LEV  OXC - hyponatremia  PHT - off since   CBZ - off since   LTG - off since 3/2021    IMAGING:   [facility] 2019 s/p fall ([facility]): Large right frontal scalp/periorbital hematoma.    NEUROPHYSIOLOGY:  Per Elliott's note, EEG 2007 without epileptiform discharges. Actual report not available.    NEUROPSYCHOLOGY:   None    Home Antiepileptic Medication and Device  LZP 1mg BID – for anxiety, impulsivity     Interpretation:    Start Date: 2021 – Day 1                                Start Time – 10:28       Duration – 21hr 29m    Daily EEG Visual Analysis  Findings: The background was continuous and reactive. During wakefulness, the posterior dominant rhythm consisted of symmetric, well-modulated 7 Hz activity, with amplitude to 30 uV, that attenuated to eye opening.     Background Slowing:  Diffuse theta activities.    Focal Slowing:   None were present.    Sleep Background:  Drowsiness was characterized by fragmentation, attenuation, and slowing of the background activity.    Sleep was characterized by the presence of vertex waves, symmetric sleep spindles and K-complexes.    Other Non-Epileptiform Findings:  None were present.    Interictal Epileptiform Activity:   None were present.    Events:  No events or seizures recorded.    Artifacts:  Intermittent myogenic and movement artifacts were noted.    ECG:  The heart rate on single channel ECG was predominantly between 80-90 BPM.    AED:  LZP 1mg BID    EEG Summary:  Abnormal EEG in the awake, drowsy and asleep states.  - Mild to moderate generalized slowing.    Impression/Clinical Correlate:   – : No events or seizures were recorded.    Interictal findings suggested mild to moderate nonspecific diffuse or multifocal cerebral dysfunction.     ________________________________________    Filomena Pastrana MD  Director, Epilepsy/EMU - [facility]

## 2021-09-01 ENCOUNTER — TRANSCRIPTION ENCOUNTER (OUTPATIENT)
Age: 66
End: 2021-09-01

## 2021-09-01 VITALS
TEMPERATURE: 98 F | HEART RATE: 60 BPM | DIASTOLIC BLOOD PRESSURE: 59 MMHG | RESPIRATION RATE: 18 BRPM | SYSTOLIC BLOOD PRESSURE: 109 MMHG | OXYGEN SATURATION: 94 %

## 2021-09-01 LAB — SARS-COV-2 RNA SPEC QL NAA+PROBE: SIGNIFICANT CHANGE UP

## 2021-09-01 PROCEDURE — U0003: CPT

## 2021-09-01 PROCEDURE — 99238 HOSP IP/OBS DSCHRG MGMT 30/<: CPT

## 2021-09-01 PROCEDURE — 85027 COMPLETE CBC AUTOMATED: CPT

## 2021-09-01 PROCEDURE — 80307 DRUG TEST PRSMV CHEM ANLYZR: CPT

## 2021-09-01 PROCEDURE — 86769 SARS-COV-2 COVID-19 ANTIBODY: CPT

## 2021-09-01 PROCEDURE — 95714 VEEG EA 12-26 HR UNMNTR: CPT

## 2021-09-01 PROCEDURE — 80048 BASIC METABOLIC PNL TOTAL CA: CPT

## 2021-09-01 PROCEDURE — 99233 SBSQ HOSP IP/OBS HIGH 50: CPT

## 2021-09-01 PROCEDURE — 36415 COLL VENOUS BLD VENIPUNCTURE: CPT

## 2021-09-01 PROCEDURE — U0005: CPT

## 2021-09-01 PROCEDURE — 87635 SARS-COV-2 COVID-19 AMP PRB: CPT

## 2021-09-01 PROCEDURE — 86803 HEPATITIS C AB TEST: CPT

## 2021-09-01 PROCEDURE — 95700 EEG CONT REC W/VID EEG TECH: CPT

## 2021-09-01 RX ADMIN — OLANZAPINE 5 MILLIGRAM(S): 15 TABLET, FILM COATED ORAL at 13:27

## 2021-09-01 RX ADMIN — PANTOPRAZOLE SODIUM 40 MILLIGRAM(S): 20 TABLET, DELAYED RELEASE ORAL at 06:25

## 2021-09-01 RX ADMIN — HALOPERIDOL DECANOATE 10 MILLIGRAM(S): 100 INJECTION INTRAMUSCULAR at 06:25

## 2021-09-01 RX ADMIN — Medication 1 MILLIGRAM(S): at 09:06

## 2021-09-01 RX ADMIN — Medication 1 MILLIGRAM(S): at 13:27

## 2021-09-01 RX ADMIN — Medication 25 MILLIGRAM(S): at 06:25

## 2021-09-01 RX ADMIN — FINASTERIDE 5 MILLIGRAM(S): 5 TABLET, FILM COATED ORAL at 13:29

## 2021-09-01 RX ADMIN — LITHIUM CARBONATE 150 MILLIGRAM(S): 300 TABLET, EXTENDED RELEASE ORAL at 06:25

## 2021-09-01 RX ADMIN — Medication 1 MILLIGRAM(S): at 16:17

## 2021-09-01 RX ADMIN — POLYETHYLENE GLYCOL 3350 17 GRAM(S): 17 POWDER, FOR SOLUTION ORAL at 13:27

## 2021-09-01 NOTE — DISCHARGE NOTE NURSING/CASE MANAGEMENT/SOCIAL WORK - NSDCVIVACCINE_GEN_ALL_CORE_FT
Tdap; 29-Jan-2019 11:40; Ariel Jay (RN); [employer]; [id number] (Exp. Date: 11-Feb-2020); IntraMuscular; Deltoid Left.; 0.5 milliLiter(s); VIS (VIS Published: 09-May-2013, VIS Presented: 29-Jan-2019);   Tdap; 09-Dec-2019 12:02; Yolanda Massey (RN); [employer]; [id number] (Exp. Date: 22-Oct-2021); IntraMuscular; Deltoid Left.; 0.5 milliLiter(s); VIS (VIS Published: 09-May-2013, VIS Presented: 09-Dec-2019);   Tdap; 17-Dec-2019 01:41; Hermann Shanks (RN); [employer]; [id number] (Exp. Date: 22-Oct-2021); IntraMuscular; Deltoid Right.; 0.5 milliLiter(s); VIS (VIS Published: 09-May-2013, VIS Presented: 17-Dec-2019);

## 2021-09-01 NOTE — PROGRESS NOTE ADULT - SUBJECTIVE AND OBJECTIVE BOX
[facility]                                                                     MD May Nguyen,                                               Epilepsy Consult #: [id number] ([phone])                                               Office: [address] [place] [address], [address]                                                 Phone: [phone]; Fax: [phone]                            ==============================================    EPILEPSY FOLLOW-UP NOTE      INTERVAL HISTORY:  Pulled off EEG electrodes around 3:38am.     MEDICATIONS:   benztropine 1 milliGRAM(s) Oral daily  enoxaparin Injectable 40 milliGRAM(s) SubCutaneous daily  finasteride 5 milliGRAM(s) Oral daily  haloperidol     Tablet 10 milliGRAM(s) Oral two times a day  lithium 150 milliGRAM(s) Oral two times a day  lithium 300 milliGRAM(s) Oral <User Schedule>  LORazepam     Tablet 1 milliGRAM(s) Oral <User Schedule>  metoprolol succinate ER 25 milliGRAM(s) Oral daily  OLANZapine 5 milliGRAM(s) Oral <User Schedule>  pantoprazole    Tablet 40 milliGRAM(s) Oral before breakfast  polyethylene glycol 3350 17 Gram(s) Oral daily  tamsulosin 0.4 milliGRAM(s) Oral at bedtime    Vital Signs Last 24 Hrs  T(C): 36.9 (01 Sep 2021 04:24), Max: 37.2 (31 Aug 2021 10:36)  T(F): 98.5 (01 Sep 2021 04:24), Max: 98.9 (31 Aug 2021 10:36)  HR: 65 (01 Sep 2021 04:24) (65 - 94)  BP: 130/77 (01 Sep 2021 04:24) (96/58 - 130/77)  BP(mean): --  RR: 18 (01 Sep 2021 04:24) (17 - 18)  SpO2: 95% (01 Sep 2021 04:24) (91% - 95%)    GENERAL PHYSICAL EXAM:  GEN: no distress  HEENT: NCAT, OP clear  EYES: sclera white, conjunctiva clear, no nystagmus  NECK: supple  CV: RRR 		  PULM: CTAB, no wheezing  GI: soft ABD, +BS, NT, ND  EXT: peripheral pulse intact, no cyanosis  MSK: muscle tone and strength normal  SKIN: warm, dry, no rash or lesion on exposed skin     NEUROLOGICAL EXAM:  Mental Status  Orientation: awake and alert  Language: moderate-severe dysarthria    Cranial Nerves  PERRL, grossly looking in all directions   No facial asymmetry  BL hearing intact     Motor  5/5 in all EXTs    Sensation  Intact to light touch and pinprick in all 4 EXTs    Reflex  2+ in BL brachioradialis, patella     Coordination  Pt refused to cooperate    Gait  Deferred      LABS:                          13.0   5.74  )-----------( 158      ( 30 Aug 2021 10:19 )             38.9     08-30    138  |  104  |  19.0  ----------------------------<  92  4.6   |  23.0  |  1.06    Ca    10.2      30 Aug 2021 10:19        OTHER IMAGING AND STUDIES:    EMU 8/30 - 9/1/21:  EEG Summary:  Abnormal EEG in the awake, drowsy and asleep states.  - Mild to moderate generalized slowing.    Impression/Clinical Correlate:  8/30 – 8/31: No events or seizures were recorded.  8/31 – 9/01: No events or seizures were recorded.    During this EMU admission, no events or seizures were recorded. Interictal findings suggested mild to moderate nonspecific diffuse or multifocal cerebral dysfunction. 
CC: Follow up    INTERVAL HPI/OVERNIGHT EVENTS: Patient seen and examined, pulled of EEG leads at 4:30 this morning. No acute events overnight.       Vital Signs Last 24 Hrs  T(C): 36.9 (01 Sep 2021 04:24), Max: 37.2 (31 Aug 2021 10:36)  T(F): 98.5 (01 Sep 2021 04:24), Max: 98.9 (31 Aug 2021 10:36)  HR: 65 (01 Sep 2021 04:24) (65 - 94)  BP: 130/77 (01 Sep 2021 04:24) (96/58 - 130/77)  BP(mean): --  RR: 18 (01 Sep 2021 04:24) (17 - 18)  SpO2: 95% (01 Sep 2021 04:24) (91% - 95%)    PHYSICAL EXAM:    GENERAL: NAD  HEAD:  Atraumatic, Normocephalic  EYES:  conjunctiva and sclera clear  ENMT: Moist mucous membranes  NECK: Supple, No JVD  CHEST/LUNG: Clear to auscultation bilaterally; No rales, rhonchi, wheezing, or rubs  HEART: Regular rate and rhythm; No murmurs, rubs, or gallops  ABDOMEN: Soft, Nontender, Nondistended; Bowel sounds present  EXTREMITIES:  2+ Peripheral Pulses, No clubbing, cyanosis, or edema        MEDICATIONS  (STANDING):  benztropine 1 milliGRAM(s) Oral daily  enoxaparin Injectable 40 milliGRAM(s) SubCutaneous daily  finasteride 5 milliGRAM(s) Oral daily  haloperidol     Tablet 10 milliGRAM(s) Oral two times a day  lithium 150 milliGRAM(s) Oral two times a day  lithium 300 milliGRAM(s) Oral <User Schedule>  LORazepam     Tablet 1 milliGRAM(s) Oral <User Schedule>  metoprolol succinate ER 25 milliGRAM(s) Oral daily  OLANZapine 5 milliGRAM(s) Oral <User Schedule>  pantoprazole    Tablet 40 milliGRAM(s) Oral before breakfast  polyethylene glycol 3350 17 Gram(s) Oral daily  tamsulosin 0.4 milliGRAM(s) Oral at bedtime    MEDICATIONS  (PRN):      Allergies    Depakote (Other)  penicillin (Other)  Thorazine (Other)    Intolerances    Calcium (Other)        LABS:                          13.0   5.74  )-----------( 158      ( 30 Aug 2021 10:19 )             38.9     08-30    138  |  104  |  19.0  ----------------------------<  92  4.6   |  23.0  |  1.06    Ca    10.2      30 Aug 2021 10:19            RADIOLOGY & ADDITIONAL TESTS:  
CC: Follow up     INTERVAL HPI/OVERNIGHT EVENTS: Patient seen and examined, no acute events overnight. remains of vEEG      Vital Signs Last 24 Hrs  T(C): 36.6 (31 Aug 2021 08:30), Max: 36.9 (30 Aug 2021 17:13)  T(F): 97.9 (31 Aug 2021 08:30), Max: 98.4 (30 Aug 2021 17:13)  HR: 68 (31 Aug 2021 08:30) (51 - 69)  BP: 99/70 (31 Aug 2021 08:30) (99/70 - 137/80)  BP(mean): --  RR: 16 (31 Aug 2021 08:30) (16 - 18)  SpO2: 96% (31 Aug 2021 08:30) (96% - 98%)    PHYSICAL EXAM:    GENERAL: NAD  HEAD: EEG leads   EYES:  conjunctiva and sclera clear  ENMT: Moist mucous membranes  NECK: Supple, No JVD  CHEST/LUNG: Clear to auscultation bilaterally; No rales, rhonchi, wheezing, or rubs  HEART: Regular rate and rhythm; No murmurs, rubs, or gallops  ABDOMEN: Soft, Nontender, Nondistended; Bowel sounds present  EXTREMITIES:  2+ Peripheral Pulses, No clubbing, cyanosis, or edema        MEDICATIONS  (STANDING):  benztropine 1 milliGRAM(s) Oral daily  enoxaparin Injectable 40 milliGRAM(s) SubCutaneous daily  finasteride 5 milliGRAM(s) Oral daily  haloperidol     Tablet 10 milliGRAM(s) Oral two times a day  lithium 150 milliGRAM(s) Oral two times a day  lithium 300 milliGRAM(s) Oral <User Schedule>  LORazepam     Tablet 1 milliGRAM(s) Oral <User Schedule>  metoprolol succinate ER 25 milliGRAM(s) Oral daily  OLANZapine 5 milliGRAM(s) Oral <User Schedule>  pantoprazole    Tablet 40 milliGRAM(s) Oral before breakfast  polyethylene glycol 3350 17 Gram(s) Oral daily  tamsulosin 0.4 milliGRAM(s) Oral at bedtime    MEDICATIONS  (PRN):      Allergies    Depakote (Other)  penicillin (Other)  Thorazine (Other)    Intolerances    Calcium (Other)        LABS:                          13.0   5.74  )-----------( 158      ( 30 Aug 2021 10:19 )             38.9     08-30    138  |  104  |  19.0  ----------------------------<  92  4.6   |  23.0  |  1.06    Ca    10.2      30 Aug 2021 10:19            RADIOLOGY & ADDITIONAL TESTS:  
                                                                     [facility]                                                                     MD May Nguyen DO                                              Epilepsy Consult #: [id number] ([phone])                                               Office: [address] [place] [address], [address]                                                 Phone: [phone]; Fax: [phone]                            ==============================================    EPILEPSY FOLLOW-UP NOTE      INTERVAL HISTORY:  No event or seizure overnight.     MEDICATIONS:   benztropine 1 milliGRAM(s) Oral daily  enoxaparin Injectable 40 milliGRAM(s) SubCutaneous daily  finasteride 5 milliGRAM(s) Oral daily  haloperidol     Tablet 10 milliGRAM(s) Oral two times a day  lithium 150 milliGRAM(s) Oral two times a day  lithium 300 milliGRAM(s) Oral <User Schedule>  LORazepam     Tablet 1 milliGRAM(s) Oral <User Schedule>  metoprolol succinate ER 25 milliGRAM(s) Oral daily  OLANZapine 5 milliGRAM(s) Oral <User Schedule>  pantoprazole    Tablet 40 milliGRAM(s) Oral before breakfast  polyethylene glycol 3350 17 Gram(s) Oral daily  tamsulosin 0.4 milliGRAM(s) Oral at bedtime    LAST 24-HR VITALS:  T(C): 36.6 (08-31-21 @ 08:30), Max: 36.9 (08-30-21 @ 17:13)  HR: 68 (08-31-21 @ 08:30) (51 - 69)  BP: 99/70 (08-31-21 @ 08:30) (99/70 - 137/80)  ABP: --  RR: 16 (08-31-21 @ 08:30) (16 - 18)  SpO2: 96% (08-31-21 @ 08:30) (96% - 98%)  CVP(cm H2O): --    GENERAL PHYSICAL EXAM:  GEN: no distress  HEENT: NCAT, OP clear  EYES: sclera white, conjunctiva clear, no nystagmus  NECK: supple  CV: RRR 		  PULM: CTAB, no wheezing  GI: soft ABD, +BS, NT, ND  EXT: peripheral pulse intact, no cyanosis  MSK: muscle tone and strength normal  SKIN: warm, dry, no rash or lesion on exposed skin     NEUROLOGICAL EXAM:  Mental Status  Orientation: awake and alert  Language: moderate-severe dysarthria    Cranial Nerves  PERRL, grossly looking in all directions   No facial asymmetry  BL hearing intact     Motor  5/5 in all EXTs    Sensation  Intact to light touch and pinprick in all 4 EXTs    Reflex  2+ in BL brachioradialis, patella     Coordination  Pt refused to cooperate    Gait  Deferred      LABS:                          13.0   5.74  )-----------( 158      ( 30 Aug 2021 10:19 )             38.9     08-30    138  |  104  |  19.0  ----------------------------<  92  4.6   |  23.0  |  1.06    Ca    10.2      30 Aug 2021 10:19        OTHER IMAGING AND STUDIES:    U 8/30 - 8/31/21:  EEG Summary:  Abnormal EEG in the awake, drowsy and asleep states.  - Mild to moderate generalized slowing.    Impression/Clinical Correlate:  8/30 – 8/31: No events or seizures were recorded.    Interictal findings suggested mild to moderate nonspecific diffuse or multifocal cerebral dysfunction.

## 2021-09-01 NOTE — EEG REPORT - NS EEG TEXT BOX
[facility]  Epilepsy Monitoring Unit Report    [facility]: [address] [place] [address], Phone [phone]  [facility]: [address], [place] [address], Phone [phone]  [place] Office: [address], [address], [place] [address] Phone [phone]    [facility]: [address] [place] [address], Phone [phone]  [place] Office: [address] [place] [address], Phone [phone]    Patient Name: Marvin Gentile    Age: 66 year, : 1955  Patient ID: -, MRN #: -, Shepherd: -    Physician Ordering Inpatient EEG: Dr. Hernandez  Referral Source to EMU: elective admission – Dr. Pastrana    EMU Study Started: 10:28 on 21    EMU Study Ended: 03:38 on 21    Study Information:    EEG Recording Technique:  The patient underwent continuous Video-EEG monitoring, using Telemetry System hardware on the XLTek Digital System. EEG and video data were stored on a computer hard drive with important events saved in digital archive files. The material was reviewed by a physician (electroencephalographer / epileptologist) on a daily basis. Vasquez and seizure detection algorithms were utilized and reviewed. An EEG Technician attended to the patient, and was available throughout daytime work hours.  The epilepsy center neurologist was available in person or on call 24-hours per day.    EEG Placement and Labeling of Electrodes:  The EEG was performed utilizing 20 channel referential EEG connections (coronal over temporal over parasagittal montage) using all standard 10-20 electrode placements with EKG, with additional electrodes placed in the inferior temporal region using the modified 10-10 montage electrode placements for elective admissions, or if deemed necessary. Recording was at a sampling rate of 256 samples per second per channel. Time synchronized digital video recording was done simultaneously with EEG recording. A low light infrared camera was used for low light recording.         History:  This is a 66 year-old RH male with a history of schizophrenia, cerebral palsy, mild intellectual disability, DM, mitral regurgitation, seizures who presents to EMU to establish the first diagnosis of a seizure disorder.    Limited history as pt unable to provide any relevant information. Per Elliott's note, pt had seizure(s) while he was hyponatremic down to 125 on OXC. Last sz on 2003. EEG 2007 was without epileptiform discharges. Pt then tapered off of PHT and CBZ and was on LTG and LEV for mood and seizure. No longer taking LTG and LEV at this time. Presents to EMU to evaluate whether pt has a seizure disorder and need to be on AED.     SEIZURE RISK FACTORS:  Hyponatremia.    CURRENT AED:  LZP 1mg BID – for anxiety, impulsivity     PREVIOUS AEDs:  VPA – allergic  LEV  OXC - hyponatremia  PHT - off since   CBZ - off since   LTG - off since 3/2021    IMAGING:   [facility] 2019 s/p fall ([facility]): Large right frontal scalp/periorbital hematoma.    NEUROPHYSIOLOGY:  Per Elliott's note, EEG 2007 without epileptiform discharges. Actual report not available.    NEUROPSYCHOLOGY:   None    Home Antiepileptic Medication and Device  LZP 1mg BID – for anxiety, impulsivity     Interpretation:    Start Date: 2021 – Day 1                                Start Time – 10:28       Duration – 21hr 29m    Daily EEG Visual Analysis  Findings: The background was continuous and reactive. During wakefulness, the posterior dominant rhythm consisted of symmetric, well-modulated 7 Hz activity, with amplitude to 30 uV, that attenuated to eye opening.     Background Slowing:  Diffuse theta activities.    Focal Slowing:   None were present.    Sleep Background:  Drowsiness was characterized by fragmentation, attenuation, and slowing of the background activity.    Sleep was characterized by the presence of vertex waves, symmetric sleep spindles and K-complexes.    Other Non-Epileptiform Findings:  None were present.    Interictal Epileptiform Activity:   None were present.    Events:  No events or seizures recorded.    Artifacts:  Intermittent myogenic and movement artifacts were noted.    ECG:  The heart rate on single channel ECG was predominantly between 80-90 BPM.    AED:  LZP 1mg BID    Start Date: 2021 – Day 2                                       Start Time – 08:00      Duration – 19hr 38m    Daily EEG Visual Analysis  FINDINGS:  The background, artifacts and HR on single channel ECG were similar as previous day.    Interictal Epileptiform Activity:   None were present.    Events:  No events or seizures recorded.    AED:  LZP 1mg BID    EEG Summary:  Abnormal EEG in the awake, drowsy and asleep states.  - Mild to moderate generalized slowing.    Impression/Clinical Correlate:   – : No events or seizures were recorded.   – : No events or seizures were recorded.    During this EMU admission, no events or seizures were recorded. Interictal findings suggested mild to moderate nonspecific diffuse or multifocal cerebral dysfunction.     Antiepileptic medication at discharge:  Continue LZP 1mg BID for anxiety    ________________________________________    Filomena Pastrana MD  Director, Epilepsy/EMU - [facility]

## 2021-09-01 NOTE — DISCHARGE NOTE NURSING/CASE MANAGEMENT/SOCIAL WORK - PATIENT PORTAL LINK FT
You can access the FollowMyHealth Patient Portal offered by [facility] by registering at the following website: [email or web address]. By joining [employer]’s FollowMyHealth portal, you will also be able to view your health information using other applications (apps) compatible with our system.

## 2021-09-01 NOTE — PROGRESS NOTE ADULT - ASSESSMENT
The patient is a 66 year old male with a past medical history of schizophrenia, cerebral palsy, hypertension and seizures who presents for continuos vEEG from [facility] to rule out primary seizure disorder.    Assessment/Plan:    1. Rule out seizure disorder: Seizure precautions  Continuous vEEG  Fall precautions  Dr Pastrana following    2. Schizophrenia: Continue home medications  Confirmed with [facility] and reconciled appropriately    3. Hypertension on Metoprolol  BP stable    4. BPH on Flomax and finasteride    VTE Lovenox subcut    Discharge disposition: Return to [facility] in 24 hours pending vEEG results   
The patient is a 66 year old male with a past medical history of schizophrenia, cerebral palsy, hypertension and seizures who presents for continuos vEEG from [facility] to rule out primary seizure disorder.    Assessment/Plan:    1. Rule out seizure disorder: Seizure precautions  Continuous vEEG negative  Fall precautions  Dr Pastrana following    2. Schizophrenia: Continue home medications  Confirmed with [facility] and reconciled appropriately    3. Hypertension on Metoprolol  BP stable    4. BPH on Flomax and finasteride    VTE Lovenox subcut    Stable for discharge back to [facility] on current medicaitons. Spoke with Dr Pastrana and Dr Carter
66 year-old RH male with a history of schizophrenia, cerebral palsy, mild intellectual disability, DM, mitral regurgitation, seizures who presents to EMU to establish the first diagnosis of a seizure disorder. Personally reviewed all imagines, labs and other studies.    Mild to moderate generalized slowing on EEG.    Recommendation:  - cvEEG  - continue all home meds  - tele monitor  - bed rail up at all times  - bed rail padding  - OOB only with supervision and assist  - seizure precaution  - anticipate discharge home tomorrow   - follow-up with me in clinic: [facility] [place] ([phone]), [address] [place] [address]       Will continue to follow with you.   ______________________  Filomena Pastrana MD   Director, Epilepsy/EMU - [facility]   Epilepsy Consult #: [phone] ([phone]) 
66 year-old RH male with a history of schizophrenia, cerebral palsy, mild intellectual disability, DM, mitral regurgitation, seizures who presents to EMU to establish the first diagnosis of a seizure disorder. Personally reviewed all imagines, labs and other studies.    Mild to moderate generalized slowing on EEG.    Recommendation:  - discontinue cvEEG  - will not add any antiepileptic medication at this time  - continue all home meds  - tele monitor  - bed rail up at all times  - bed rail padding  - OOB only with supervision and assist  - seizure precaution  - anticipate discharge home today on same home meds   - follow-up with me in clinic: [facility] [place] ([phone]), [facility] [place] [address]       No acute intervention from Epilepsy at this time.  Please reconsult if needed.  ______________________  Filomena Pastrana MD   Director, Epilepsy/EMU - [facility]   Epilepsy Consult #: [id number]-EPILEPSY ([phone])

## 2021-10-18 ENCOUNTER — NON-APPOINTMENT (OUTPATIENT)
Age: 66
End: 2021-10-18

## 2021-10-18 ENCOUNTER — APPOINTMENT (OUTPATIENT)
Dept: CARDIOLOGY | Facility: CLINIC | Age: 66
End: 2021-10-18
Payer: MEDICARE

## 2021-10-18 VITALS
TEMPERATURE: 97.5 F | HEIGHT: 65 IN | BODY MASS INDEX: 22.82 KG/M2 | WEIGHT: 137 LBS | SYSTOLIC BLOOD PRESSURE: 110 MMHG | DIASTOLIC BLOOD PRESSURE: 71 MMHG | OXYGEN SATURATION: 99 % | HEART RATE: 53 BPM

## 2021-10-18 PROCEDURE — 93000 ELECTROCARDIOGRAM COMPLETE: CPT

## 2021-10-18 PROCEDURE — 99213 OFFICE O/P EST LOW 20 MIN: CPT

## 2021-10-18 RX ORDER — ENEMA 19; 7 G/133ML; G/133ML
7-19 ENEMA RECTAL
Refills: 0 | Status: DISCONTINUED | COMMUNITY
Start: 2021-04-16 | End: 2021-10-18

## 2021-10-18 RX ORDER — ENEMA 19; 7 G/133ML; G/133ML
7-19 ENEMA RECTAL
Refills: 0 | Status: ACTIVE | COMMUNITY
Start: 2021-10-18

## 2021-11-03 ENCOUNTER — APPOINTMENT (OUTPATIENT)
Dept: GASTROENTEROLOGY | Facility: CLINIC | Age: 66
End: 2021-11-03
Payer: MEDICARE

## 2021-11-03 VITALS
HEIGHT: 65 IN | OXYGEN SATURATION: 98 % | RESPIRATION RATE: 14 BRPM | DIASTOLIC BLOOD PRESSURE: 82 MMHG | TEMPERATURE: 98.6 F | SYSTOLIC BLOOD PRESSURE: 118 MMHG | HEART RATE: 64 BPM

## 2021-11-03 DIAGNOSIS — Z86.79 PERSONAL HISTORY OF OTHER DISEASES OF THE CIRCULATORY SYSTEM: ICD-10-CM

## 2021-11-03 PROCEDURE — 99213 OFFICE O/P EST LOW 20 MIN: CPT

## 2021-11-03 NOTE — HISTORY OF PRESENT ILLNESS
[de-identified] : Patient has been evaluated in the past. Patient from the [facility] arrived for evaluation for dyspeptic symptoms. He was having some nausea, vomiting. He underwent a CT abdomen, which showed hiatal hernia, and the gastric wall thickening. He is not taking any PPI therapy. He is passing bowel movements regularly. No abdominal discomfort at this time.  He was recommended to have a upper GI series.  He underwent upper GI series which revealed normal esophagus.  Evaluation of the stomach and duodenum could not be performed.  He is on PPI once daily.  As per the patient he still having occasional difficulty eating.  He has underlying mitral regurgitation and aortic sclerosis.  He is seen regularly by cardiologist.

## 2021-11-03 NOTE — PHYSICAL EXAM
[Sclera] : the sclera and conjunctiva were normal [PERRL With Normal Accommodation] : pupils were equal in size, round, and reactive to light [Extraocular Movements] : extraocular movements were intact [Outer Ear] : the ears and nose were normal in appearance [Oropharynx] : the oropharynx was normal [Neck Appearance] : the appearance of the neck was normal [Neck Cervical Mass (___cm)] : no neck mass was observed [Jugular Venous Distention Increased] : there was no jugular-venous distention [Thyroid Diffuse Enlargement] : the thyroid was not enlarged [Thyroid Nodule] : there were no palpable thyroid nodules [Auscultation Breath Sounds / Voice Sounds] : lungs were clear to auscultation bilaterally [Full Pulse] : the pedal pulses are present [Edema] : there was no peripheral edema [Bowel Sounds] : normal bowel sounds [Abdomen Soft] : soft [Abdomen Tenderness] : non-tender [Abdomen Mass (___ Cm)] : no abdominal mass palpated [Abnormal Walk] : normal gait [Nail Clubbing] : no clubbing  or cyanosis of the fingernails [Musculoskeletal - Swelling] : no joint swelling seen [Motor Tone] : muscle strength and tone were normal [Skin Color & Pigmentation] : normal skin color and pigmentation [Skin Turgor] : normal skin turgor [] : no rash [No Focal Deficits] : no focal deficits [Oriented To Time, Place, And Person] : oriented to person, place, and time [Impaired Insight] : insight and judgment were intact [Affect] : the affect was normal [FreeTextEntry1] : grade III systolic murmur at apex

## 2021-11-03 NOTE — ASSESSMENT
[FreeTextEntry1] : I called the patient's sister, Jacey Gentile at [phone].  I have discussed the need of an upper endoscopy to fully evaluate for his upper GI symptoms.  As long as the cardiology team give us the clearance and he has medical clearance, we will schedule him for EGD.  In the interim, patient should be continued on PPI therapy.  Discussed at length regarding the procedure with the patient sister who is also the healthcare proxy.\par \par Karan Benavides MD\par Gastroenterology \par \par

## 2021-11-26 NOTE — ADDENDUM
[FreeTextEntry1] : I was asked to risk stratify for EGD in 1/2022.\par Patient has very poor functional less than 4 Mets. Patient has Severe MR.\par Patient was seen by me in 10/18/2021.\par \par OVERALL GIVEN HIS MULTIPLE COMORBIDITIES AND POOR FUNCTIONAL STATUS, SEVERE MR, PATIENT IS ELEVATED RISK FOR PERIOPERATIVE CARDIAC EVENTS.\par

## 2021-11-26 NOTE — HISTORY OF PRESENT ILLNESS
[FreeTextEntry1] : The patient is a 66-year-old  male who is here for f/u cardiac consultation, patient is a chronic resident of [facility]. Patient is accompanied by therapy aide Martha Diaz.\par \par Patient has a schizophrenia, cerebral palsy, mild intellectual disability, seizure disoredr, and multiple other medical/psych issues. Patient is not oriented to place, person or time. Patient is unable to provide any medical history. \par \par Patient came to the office for f/u for mitral regurgitation., he was accompanied by an aide. Patient had COVID19 in May 2020.(per sister). Patient is unable to give any history. He appears comfortable, not in distress.\par According to the therapy aide, patient is apparently ambulating in the [facility], she does not report any respiratory difficulty.\par \par Patient has no complaints. \par ROS: Unable to Obtain\par \par Therapy Aide-JING Baum , accompanied the patient.

## 2021-11-26 NOTE — ASSESSMENT
[FreeTextEntry1] : Summary:\par  1. Left ventricular ejection fraction, by visual estimation, is >75%.\par  2. Normal global left ventricular systolic function.\par  3. Spectral Doppler shows impaired relaxation pattern of left \par ventricular myocardial filling (Grade I diastolic dysfunction).\par  4. Moderate anterior leaflet systolic anterior motion of the mitral \par valve with a resting gradent of 27 mm/Hg .\par  5. Eccentric Mitral regurgitation, directed posteriorly, possible Severe \par MR cannot be ruled out.\par  6. Trace tricuspid regurgitation.\par  7. Mild aortic regurgitation.\par  8. Sclerotic aortic valve with normal opening.\par  9. Trace pulmonic valve regurgitation.\par 10. Turbulent flow noted in LVOT due to ROBERT.\par \par [id number] Christiano Cabello MD, Electronically signed on 11/23/2018 at 1:17:59 \par PM\par \par ECHO 2/2020- LVEF 65-70%. Moderate to Severe MR\par \par EKG-6/29/2020- NSR. Early repolarization.\par \par Echocardiogram March 1, 2021: LVEF 65 to 70%.  Robert with LVOT obstruction visualized, resting LVOT gradient 97 mmHg.  Mild aortic regurgitation.  Moderate to severe mitral regurgitation.\par \par EKG 10/18/2021- Sinus  Bradycardia \par WITHIN NORMAL LIMITS\par \par Assessment:\par 1. Probably Severe Mitral Regurgitation-at this point it is difficult to evaluate whether if he has any symptoms from this mitral regurgitation .based on the available history, patient is probably asymptomatic. Patient is also not a surgical candidate for mitral valve surgery\par 2. Multiple Psych/Medical Issues\par \par Recommendations:\par \par On 4/16/2021 - discussion with the family as follows\par Patient's healthcare proxy is his sister. phone number of his HCP- Sister-Jacey GentileHwjuxqpn-[phone].\par I spoke to his sister I have informed her about Mitral regurgitation natural history, eventually may have CHF, may need Teri Valve Surgery. However patient is not an Ideal candidate for surgery because of his comorbidities. I have informed her that I have not even sent him for Surgical Consultation. I have informed her that she has to make a decision about  Advanced Directive's DNR etc.\par \par I discussed echocardiogram results with the patient's sister there is a LVOT obstruction due to the ROBERT we will be initating  beta-blocker therapy Toprol-XL 25 mg once daily.  She is in agreement with the plan.\par \par Once again I have emphasized to the sister that they need to have advanced directive plan in case if he decompensates from this mitral regurgitation and if he has a cardiac arrest, I have strongly recommended them to informed facility about patient's CODE STATUS.  She verbalized understanding\par \par Recommendations: Continue current medical therapy which includes Toprol.\par Follow-up in 6 months\par

## 2021-12-29 ENCOUNTER — APPOINTMENT (OUTPATIENT)
Dept: GASTROENTEROLOGY | Facility: CLINIC | Age: 66
End: 2021-12-29

## 2022-01-07 ENCOUNTER — APPOINTMENT (OUTPATIENT)
Dept: NEUROLOGY | Facility: CLINIC | Age: 67
End: 2022-01-07

## 2022-01-25 ENCOUNTER — APPOINTMENT (OUTPATIENT)
Dept: CARDIOLOGY | Facility: CLINIC | Age: 67
End: 2022-01-25

## 2022-02-04 ENCOUNTER — APPOINTMENT (OUTPATIENT)
Dept: CARDIOLOGY | Facility: CLINIC | Age: 67
End: 2022-02-04

## 2022-02-09 ENCOUNTER — APPOINTMENT (OUTPATIENT)
Dept: GASTROENTEROLOGY | Facility: CLINIC | Age: 67
End: 2022-02-09
Payer: MEDICARE

## 2022-02-09 VITALS
TEMPERATURE: 97.2 F | OXYGEN SATURATION: 98 % | DIASTOLIC BLOOD PRESSURE: 70 MMHG | SYSTOLIC BLOOD PRESSURE: 110 MMHG | WEIGHT: 141 LBS | HEIGHT: 65 IN | HEART RATE: 73 BPM | BODY MASS INDEX: 23.49 KG/M2

## 2022-02-09 DIAGNOSIS — Z09 ENCOUNTER FOR FOLLOW-UP EXAMINATION AFTER COMPLETED TREATMENT FOR CONDITIONS OTHER THAN MALIGNANT NEOPLASM: ICD-10-CM

## 2022-02-09 PROCEDURE — 99213 OFFICE O/P EST LOW 20 MIN: CPT

## 2022-02-09 NOTE — HISTORY OF PRESENT ILLNESS
[de-identified] : Patient is being evaluated for history of dyspeptic symptoms.  He was noted to have gastric wall thickening on CT abdominal and hiatal hernia.  He had upper GI series which did not reveal good assessment of stomach and duodenum.  Normal esophagus was noted.  He was evaluated by cardiologist and was cleared for EGD.  He is from [facility].

## 2022-02-09 NOTE — ASSESSMENT
[FreeTextEntry1] : The patient with significant psychiatric illness and cardiac disease although evaluated by cardiology.  On the last visit had spoken to the patient's sister who is the healthcare proxy.  We will schedule the patient for the procedure.

## 2022-02-15 ENCOUNTER — APPOINTMENT (OUTPATIENT)
Dept: GASTROENTEROLOGY | Facility: HOSPITAL | Age: 67
End: 2022-02-15

## 2022-03-11 NOTE — ED ADULT TRIAGE NOTE - LOCATION:
Physical Therapy Daily Progress Note    VISIT#: 5    Subjective   Karis Gudino reports that she has been pain free in the shldr.  No occurrence of popping noted.  She has practiced bball and track wo incident.       Objective   L shldr AROM:  WNLs wo pain  MMT:  5/5 flex, ext,  4+ abd   4- ER,  4+IR wo pain  Palpation wo tenderness noted at the greater tuberosity or anterior capsule.   Special tests:  (-) apprehension. Neer's, belly press  See Exercise, Manual, and Modality Logs for complete treatment.     Patient Education:  Review of home and gym ex completed.     Assessment/Plan  Mariela has only been to PT x's 5 sessions, including the eval date of 22.  She presents today with a symptom free assessment and sufficient strength in the shldr for return to sport.  It is noted that the RC is weak and with her hypermobility, strengthening of the core shldr groups need to be continued on an ongoing basis.     ST visits met 3/11/22     1)  Pain level in the left shldr 00-5/10     2)  AROM L shldr WNLS and pain free     3)  Patient to report a 50% or greater improved ability to sleep wo greater pain     4)  Patient to report the ability to wash/bath and dress wo alterations and wo pain     5)  Complete current HEP with correct technique    LTG:  DC     1)  MMT =/> 5-/5 in order to complete daily tasks partial 3/11/22     2)  (-) special tests in order for patient to complete lifting and resume prior sporting activities wo pain  Met 3/11/22     3)  Improve Quick DASH score =/> 12 points ( eval score 31/55)  NC 3/11/22    Plan:  Hold PT.  She is to play in a [employer] tournament this weekend.  Recheck with pnt in 7 - 10 days on status.  Will anticipate to DC if no changes.           Timed:         Manual Therapy:         mins  71179;     Therapeutic Exercise:    13     mins  97804;     Neuromuscular Sarah Beth:    16    mins  60911;    Therapeutic Activity:     12     mins  24957;     Gait Training:           mins   59409;     Ultrasound:          mins  80119;    Ionto                                   mins   27696  Self Care                            mins   52254  Canalith Repos                   mins  46496    Un-Timed:  Electrical Stimulation:         mins  02247 ( );  Dry Needling          mins self-pay  Traction          mins 26707  Low Eval          Mins  40052  Mod Eval          Mins  90148  High Eval                            Mins  46681  Re-Eval                               mins  92965    Timed Treatment:   41   mins   Total Treatment:     47   mins    Sumaya Grimes PT    Physical Therapist   Left arm;

## 2022-03-23 PROBLEM — K44.9 HIATAL HERNIA: Status: ACTIVE | Noted: 2020-08-05

## 2022-03-23 PROBLEM — K21.9 ACID REFLUX: Status: ACTIVE | Noted: 2022-02-09

## 2022-03-23 PROBLEM — K21.9 GERD (GASTROESOPHAGEAL REFLUX DISEASE): Status: ACTIVE | Noted: 2020-06-29

## 2022-03-23 PROBLEM — K31.89 GASTRIC WALL THICKENING: Status: ACTIVE | Noted: 2022-02-09

## 2022-03-24 ENCOUNTER — RESULT REVIEW (OUTPATIENT)
Age: 67
End: 2022-03-24

## 2022-03-24 ENCOUNTER — OUTPATIENT (OUTPATIENT)
Dept: OUTPATIENT SERVICES | Facility: HOSPITAL | Age: 67
LOS: 1 days | End: 2022-03-24
Payer: COMMERCIAL

## 2022-03-24 ENCOUNTER — TRANSCRIPTION ENCOUNTER (OUTPATIENT)
Age: 67
End: 2022-03-24

## 2022-03-24 ENCOUNTER — APPOINTMENT (OUTPATIENT)
Dept: GASTROENTEROLOGY | Facility: HOSPITAL | Age: 67
End: 2022-03-24

## 2022-03-24 DIAGNOSIS — K31.89 OTHER DISEASES OF STOMACH AND DUODENUM: ICD-10-CM

## 2022-03-24 DIAGNOSIS — K44.9 DIAPHRAGMATIC HERNIA W/OUT OBSTRUCTION OR GANGRENE: ICD-10-CM

## 2022-03-24 DIAGNOSIS — K21.9 GASTRO-ESOPHAGEAL REFLUX DISEASE WITHOUT ESOPHAGITIS: ICD-10-CM

## 2022-03-24 DIAGNOSIS — Z98.49 CATARACT EXTRACTION STATUS, UNSPECIFIED EYE: Chronic | ICD-10-CM

## 2022-03-24 DIAGNOSIS — K21.9 GASTRO-ESOPHAGEAL REFLUX DISEASE W/OUT ESOPHAGITIS: ICD-10-CM

## 2022-03-24 PROCEDURE — 88342 IMHCHEM/IMCYTCHM 1ST ANTB: CPT | Mod: 26

## 2022-03-24 PROCEDURE — 88305 TISSUE EXAM BY PATHOLOGIST: CPT | Mod: 26

## 2022-03-24 PROCEDURE — 88305 TISSUE EXAM BY PATHOLOGIST: CPT

## 2022-03-24 PROCEDURE — 43239 EGD BIOPSY SINGLE/MULTIPLE: CPT

## 2022-03-24 PROCEDURE — 88342 IMHCHEM/IMCYTCHM 1ST ANTB: CPT

## 2022-03-24 NOTE — REVIEW OF SYSTEMS
How Severe Are Your Spot(S)?: moderate What Type Of Note Output Would You Prefer (Optional)?: Standard Output What Is The Reason For Today's Visit?: Skin Lesion What Is The Reason For Today's Visit? (Being Monitored For X): concerning skin lesions on an annual basis Additional History: Uncle  of mm, pt would like a full body due the the family history [As Noted in HPI] : as noted in HPI [Negative] : Heme/Lymph

## 2022-03-28 LAB — SURGICAL PATHOLOGY STUDY: SIGNIFICANT CHANGE UP

## 2022-04-13 NOTE — HISTORY OF PRESENT ILLNESS
[de-identified] :  Patient is being evaluated for history of dyspeptic symptoms. He was noted to have gastric wall thickening on CT abdominal and hiatal hernia. He had upper GI series which did not reveal good assessment of stomach and duodenum. Normal esophagus was noted. He was evaluated by cardiologist and was cleared for EGD. He is from [facility].

## 2022-04-13 NOTE — PHYSICAL EXAM
[Sclera] : the sclera and conjunctiva were normal [PERRL With Normal Accommodation] : pupils were equal in size, round, and reactive to light [Extraocular Movements] : extraocular movements were intact [Outer Ear] : the ears and nose were normal in appearance [Oropharynx] : the oropharynx was normal [Neck Appearance] : the appearance of the neck was normal [Neck Cervical Mass (___cm)] : no neck mass was observed [Jugular Venous Distention Increased] : there was no jugular-venous distention [Thyroid Diffuse Enlargement] : the thyroid was not enlarged [Thyroid Nodule] : there were no palpable thyroid nodules [Auscultation Breath Sounds / Voice Sounds] : lungs were clear to auscultation bilaterally [Full Pulse] : the pedal pulses are present [Edema] : there was no peripheral edema [Bowel Sounds] : normal bowel sounds [Abdomen Soft] : soft [Abdomen Tenderness] : non-tender [Abdomen Mass (___ Cm)] : no abdominal mass palpated [Abnormal Walk] : normal gait [Nail Clubbing] : no clubbing  or cyanosis of the fingernails [Musculoskeletal - Swelling] : no joint swelling seen [Motor Tone] : muscle strength and tone were normal [Skin Color & Pigmentation] : normal skin color and pigmentation [Skin Turgor] : normal skin turgor [] : no rash [No Focal Deficits] : no focal deficits [Oriented To Time, Place, And Person] : oriented to person, place, and time [Affect] : the affect was normal [Impaired Insight] : insight and judgment were intact [FreeTextEntry1] : grade III systolic murmur at apex

## 2022-04-15 ENCOUNTER — APPOINTMENT (OUTPATIENT)
Dept: CARDIOLOGY | Facility: CLINIC | Age: 67
End: 2022-04-15
Payer: MEDICARE

## 2022-04-15 ENCOUNTER — NON-APPOINTMENT (OUTPATIENT)
Age: 67
End: 2022-04-15

## 2022-04-15 VITALS
TEMPERATURE: 97.6 F | DIASTOLIC BLOOD PRESSURE: 70 MMHG | HEIGHT: 65 IN | WEIGHT: 135 LBS | OXYGEN SATURATION: 97 % | SYSTOLIC BLOOD PRESSURE: 118 MMHG | BODY MASS INDEX: 22.49 KG/M2 | HEART RATE: 66 BPM

## 2022-04-15 PROCEDURE — 99213 OFFICE O/P EST LOW 20 MIN: CPT

## 2022-04-15 PROCEDURE — 93000 ELECTROCARDIOGRAM COMPLETE: CPT

## 2022-04-15 RX ORDER — TAMSULOSIN HYDROCHLORIDE 0.4 MG/1
0.4 CAPSULE ORAL
Qty: 90 | Refills: 1 | Status: ACTIVE | COMMUNITY
Start: 2021-10-18

## 2022-04-15 RX ORDER — FINASTERIDE 5 MG/1
5 TABLET, FILM COATED ORAL DAILY
Qty: 30 | Refills: 5 | Status: ACTIVE | COMMUNITY
Start: 2021-10-18

## 2022-04-15 RX ORDER — OLANZAPINE 5 MG/1
5 TABLET, FILM COATED ORAL
Refills: 0 | Status: ACTIVE | COMMUNITY

## 2022-04-15 RX ORDER — LITHIUM CARBONATE 300 MG/1
300 TABLET ORAL
Refills: 0 | Status: DISCONTINUED | COMMUNITY
Start: 2021-10-18 | End: 2022-04-15

## 2022-04-15 RX ORDER — FINASTERIDE 5 MG/1
5 TABLET, FILM COATED ORAL
Refills: 0 | Status: DISCONTINUED | COMMUNITY
End: 2022-04-15

## 2022-04-15 RX ORDER — METOPROLOL SUCCINATE 25 MG/1
25 TABLET, EXTENDED RELEASE ORAL
Qty: 90 | Refills: 3 | Status: DISCONTINUED | COMMUNITY
Start: 2021-04-16 | End: 2022-04-15

## 2022-04-15 RX ORDER — TAMSULOSIN HYDROCHLORIDE 0.4 MG/1
0.4 CAPSULE ORAL
Refills: 0 | Status: DISCONTINUED | COMMUNITY
Start: 2021-04-16 | End: 2022-04-15

## 2022-04-15 NOTE — HISTORY OF PRESENT ILLNESS
[FreeTextEntry1] : The patient is a 66-year-old  male who is here for f/u cardiac consultation, patient is a chronic resident of [facility] facility. Patient is accompanied by therapy aide Martha Diaz.\par \par Patient has a schizophrenia, cerebral palsy, mild intellectual disability, seizure disoredr, and multiple other medical/psych issues. Patient is not oriented to place, person or time. \par \par Patient came to the office for f/u for mitral regurgitation., he was accompanied by an aide. Patient had COVID19 in May 2020.(per sister). Patient is unable to give any history. He appears comfortable, not in distress.\par According to the therapy aide, patient is apparently ambulating in the [facility], she does not report any respiratory difficulty.\par \par \par ROS: Unable to Obtain\par \par Therapy Aide-JING Castro , accompanied the patient.

## 2022-04-15 NOTE — ASSESSMENT
[FreeTextEntry1] : Summary:\par  1. Left ventricular ejection fraction, by visual estimation, is >75%.\par  2. Normal global left ventricular systolic function.\par  3. Spectral Doppler shows impaired relaxation pattern of left \par ventricular myocardial filling (Grade I diastolic dysfunction).\par  4. Moderate anterior leaflet systolic anterior motion of the mitral \par valve with a resting gradent of 27 mm/Hg .\par  5. Eccentric Mitral regurgitation, directed posteriorly, possible Severe \par MR cannot be ruled out.\par  6. Trace tricuspid regurgitation.\par  7. Mild aortic regurgitation.\par  8. Sclerotic aortic valve with normal opening.\par  9. Trace pulmonic valve regurgitation.\par 10. Turbulent flow noted in LVOT due to ROBERT.\par \par [id number] Christiano Cabello MD, Electronically signed on 11/23/2018 at 1:17:59 \par PM\par \par ECHO 2/2020- LVEF 65-70%. Moderate to Severe MR\par \par EKG-6/29/2020- NSR. Early repolarization.\par \par Echocardiogram March 1, 2021: LVEF 65 to 70%.  Robert with LVOT obstruction visualized, resting LVOT gradient 97 mmHg.  Mild aortic regurgitation.  Moderate to severe mitral regurgitation.\par \par EKG 10/18/2021- Sinus  Bradycardia \par WITHIN NORMAL LIMITS\par \par EKG 4/15/2022- Sinus  Rhythm  - occasional PAC   \par # PACs = 1.\par -Inferior ST-elevation -repolarization variant. \par \par Assessment:\par 1. Probably Severe Mitral Regurgitation-at this point it is difficult to evaluate whether if he has any symptoms from this mitral regurgitation .based on the available history, patient is probably asymptomatic. Patient is also not a surgical candidate for mitral valve surgery\par 2. Multiple Psych/Medical Issues\par \par Recommendations:\par \par On 4/16/2021 - discussion with the family as follows\par Patient's healthcare proxy is his sister. phone number of his HCP- Sister-Jacey GentileGudtxslj-[phone].\par I spoke to his sister I have informed her about Mitral regurgitation natural history, eventually may have CHF, may need Teri Valve Surgery. However patient is not an Ideal candidate for surgery because of his comorbidities. I have informed her that I have not even sent him for Surgical Consultation. I have informed her that she has to make a decision about  Advanced Directive's DNR etc.\par \par I discussed echocardiogram results with the patient's sister there is a LVOT obstruction due to the ROBERT we will be initating  beta-blocker therapy Toprol-XL 25 mg once daily.  She is in agreement with the plan.\par \par Once again I have emphasized to the sister that they need to have advanced directive plan in case if he decompensates from this mitral regurgitation and if he has a cardiac arrest, I have strongly recommended them to informed facility about patient's CODE STATUS.  She verbalized understanding\par \par Recommendations: Continue current medical therapy which includes Toprol.\par Follow-up in 6 months\par

## 2022-04-15 NOTE — PHYSICAL EXAM
[Normal] : no edema, no cyanosis, no clubbing, no varicosities [de-identified] : Holosystolic murmur 3/6 heard at apex

## 2022-04-27 NOTE — ED PROCEDURE NOTE - SUBCUTANEOUS SUTURE
Impression: Vitreous degeneration, bilateral: H43.813.  Bilateral. Plan: --PVD OU, appears chronic
--RDW discussed interrupted

## 2022-06-14 ENCOUNTER — OUTPATIENT (OUTPATIENT)
Dept: OUTPATIENT SERVICES | Facility: HOSPITAL | Age: 67
LOS: 1 days | End: 2022-06-14
Payer: COMMERCIAL

## 2022-06-14 DIAGNOSIS — F20.9 SCHIZOPHRENIA, UNSPECIFIED: ICD-10-CM

## 2022-06-14 DIAGNOSIS — Z98.49 CATARACT EXTRACTION STATUS, UNSPECIFIED EYE: Chronic | ICD-10-CM

## 2022-06-14 PROCEDURE — 0225U NFCT DS DNA&RNA 21 SARSCOV2: CPT

## 2022-07-08 NOTE — DISCHARGE NOTE PROVIDER - HOSPITAL COURSE
66 year old male with a past medical history of schizophrenia, cerebral palsy, hypertension and seizures who presents directly for admission to EMU on 8/30 for continuos vEEG from [facility] to rule out primary seizure disorder and establish first diagnosis of seizure disorder.  Per [facility]'s note, pt had seizure(s) while he was hyponatremic down to 125 on OXC. Last sz on 2/13/2003. EEG 2/21/2007 was without epileptiform discharges. Pt then tapered off of PHT and CBZ and was on LTG and LEV for mood and seizure. No longer taking LTG and LEV at this time. Patient is unable to provide accurate history. Patient was seen by Dr. Pastrana and recent EEG with no seizures but was abnormal with interictal findings suggested mild to moderate nonspecific diffuse or multifocal cerebral dysfunction.       Assessment and Plan:     1. Rule out seizure disorder:   Seizure precautions  Continuous vEEG for next 24 hours prior to DC  Fall precautions  Dr Pastrana following    2. Schizophrenia: Continue home medications and is Compliant  Confirmed with [facility] and reconciled appropriately    3. Hypertension on Metoprolol  BP stable and c/w with metoprolol     4. BPH on Flomax and finasteride      Discharge disposition: Return to [facility] in 24 hours pending vEEG results    Reyes, Jenny(Attending) 66 year old male with a past medical history of schizophrenia, cerebral palsy, hypertension and seizures who presents directly for admission to EMU on 8/30 for continuos vEEG from [facility] to rule out primary seizure disorder and establish first diagnosis of seizure disorder.  Per [facility]'s note, pt had seizure(s) while he was hyponatremic down to 125 on OXC. Last sz on 2/13/2003. EEG 2/21/2007 was without epileptiform discharges. Pt then tapered off of PHT and CBZ and was on LTG and LEV for mood and seizure. No longer taking LTG and LEV at this time. Patient is unable to provide accurate history. Patient was seen by Dr. Pastrana and recent EEG with no seizures but was abnormal with interictal findings suggested mild to moderate nonspecific diffuse or multifocal cerebral dysfunction.       Assessment and Plan:     1. Rule out seizure disorder:   Seizure precautions  Continuous vEEG for next 24 hours prior to DC  Fall precautions  Dr Pastrana following    2. Schizophrenia: Continue home medications and is Compliant  Confirmed with [facility] and reconciled appropriately    3. Hypertension on Metoprolol  BP stable and c/w with metoprolol     4. BPH on Flomax and finasteride      Discharge disposition: Return to [facility] in 24 hours pending vEEG results    66 year old male with a past medical history of schizophrenia, cerebral palsy, hypertension and seizures who presents directly for admission to EMU on 8/30 for continuous vEEG from [place] to rule out primary seizure disorder and establish first diagnosis of seizure disorder.  Per [place]'s note, pt had seizure(s) while he was hyponatremic down to 125 on OXC. Last sz on 2/13/2003. EEG 2/21/2007 was without epileptiform discharges. Pt then tapered off of PHT and CBZ and was on LTG and LEV for mood and seizure. No longer taking LTG and LEV at this time. Patient was seen by Dr. Pastrana and recent EEG with no seizures but was abnormal with interictal findings suggested mild to moderate nonspecific diffuse or multifocal cerebral dysfunction.        Length of discharge 35 min   66 year old male with a past medical history of schizophrenia, cerebral palsy, hypertension and seizures who presents directly for admission to EMU on 8/30 for continuous vEEG from [place] to rule out primary seizure disorder and establish first diagnosis of seizure disorder.  Per [place]'s note, pt had seizure(s) while he was hyponatremic down to 125 on OXC. Last sz on 2/13/2003. EEG 2/21/2007 was without epileptiform discharges. Pt then tapered off of PHT and CBZ and was on LTG and LEV for mood and seizure. No longer taking LTG and LEV at this time. Patient was seen by Dr. Pastrana and recent EEG with no seizures but was abnormal with interictal findings suggested mild to moderate nonspecific diffuse or multifocal cerebral dysfunction.        Length of discharge 45 min

## 2022-09-16 NOTE — ED ADULT TRIAGE NOTE - SPO2 (%)
No new care gaps identified.  [facility] Embedded Care Gaps. Reference number: [id number]. 9/16/2022   11:39:37 AM BRISSAT  
Refill Routing Note   Medication(s) are not appropriate for processing by [facility] for the following reason(s):      - Required laboratory values are outdated    [facility] action(s):  Defer          Medication reconciliation completed: No     Appointments  past 12m or future 3m with PCP    Date Provider   Last Visit   3/24/2022 Hai Corrigan MD   Next Visit   9/28/2022 Hai Corrigan MD   ED visits in past 90 days: 0        Note composed:4:08 PM 09/16/2022          
96

## 2022-10-24 ENCOUNTER — APPOINTMENT (OUTPATIENT)
Dept: CARDIOLOGY | Facility: CLINIC | Age: 67
End: 2022-10-24

## 2022-10-24 ENCOUNTER — NON-APPOINTMENT (OUTPATIENT)
Age: 67
End: 2022-10-24

## 2022-10-24 VITALS
WEIGHT: 148 LBS | HEART RATE: 69 BPM | OXYGEN SATURATION: 95 % | HEIGHT: 65 IN | TEMPERATURE: 98.2 F | DIASTOLIC BLOOD PRESSURE: 65 MMHG | BODY MASS INDEX: 24.66 KG/M2 | SYSTOLIC BLOOD PRESSURE: 113 MMHG

## 2022-10-24 DIAGNOSIS — I34.9 NONRHEUMATIC MITRAL VALVE DISORDER, UNSPECIFIED: ICD-10-CM

## 2022-10-24 PROCEDURE — 99213 OFFICE O/P EST LOW 20 MIN: CPT

## 2022-10-24 PROCEDURE — 93000 ELECTROCARDIOGRAM COMPLETE: CPT

## 2022-10-24 RX ORDER — METOPROLOL SUCCINATE 25 MG/1
25 TABLET, EXTENDED RELEASE ORAL
Qty: 90 | Refills: 3 | Status: ACTIVE | COMMUNITY

## 2022-10-24 NOTE — HISTORY OF PRESENT ILLNESS
[FreeTextEntry1] : The patient is a 66-year-old  male who is here for f/u cardiac consultation, patient is a chronic resident of [facility] facility. .Patient had COVID19 in May 2020.(per sister)\par \par Patient has a schizophrenia, cerebral palsy, mild intellectual disability, seizure disoredr, and multiple other medical/psych issues. Patient is not oriented to place, person or time. \par \par Patient came to the office for f/u for mitral regurgitation., he was accompanied by an aide. . Patient is unable to give any history. He appears comfortable, not in distress.\par According to the therapy aide, patient is apparently ambulating in the [facility], she does not report any respiratory difficulty.\par \par \par ROS: Unable to Obtain\par \par Therapy Aide-Lawson Reese,,[facility] , accompanied the patient.

## 2022-10-24 NOTE — ASSESSMENT
[FreeTextEntry1] : Summary:\par  1. Left ventricular ejection fraction, by visual estimation, is >75%.\par  2. Normal global left ventricular systolic function.\par  3. Spectral Doppler shows impaired relaxation pattern of left \par ventricular myocardial filling (Grade I diastolic dysfunction).\par  4. Moderate anterior leaflet systolic anterior motion of the mitral \par valve with a resting gradent of 27 mm/Hg .\par  5. Eccentric Mitral regurgitation, directed posteriorly, possible Severe \par MR cannot be ruled out.\par  6. Trace tricuspid regurgitation.\par  7. Mild aortic regurgitation.\par  8. Sclerotic aortic valve with normal opening.\par  9. Trace pulmonic valve regurgitation.\par 10. Turbulent flow noted in LVOT due to ROBERT.\par \par [id number] Christiano Cabello MD, Electronically signed on 11/23/2018 at 1:17:59 \par PM\par \par ECHO 2/2020- LVEF 65-70%. Moderate to Severe MR\par \par EKG-6/29/2020- NSR. Early repolarization.\par \par Echocardiogram March 1, 2021: LVEF 65 to 70%.  Robert with LVOT obstruction visualized, resting LVOT gradient 97 mmHg.  Mild aortic regurgitation.  Moderate to severe mitral regurgitation.\par \par EKG 10/18/2021- Sinus  Bradycardia \par WITHIN NORMAL LIMITS\par \par EKG 4/15/2022- Sinus  Rhythm  - occasional PAC   \par # PACs = 1.\par -Inferior ST-elevation -repolarization variant. \par \par EKG 10/24/2022- Sinus  Rhythm \par WITHIN NORMAL LIMITS\par \par Assessment:\par 1. Probably Severe Mitral Regurgitation-at this point it is difficult to evaluate whether if he has any symptoms from this mitral regurgitation .based on the available history, patient is probably asymptomatic. Patient is also not a surgical candidate for mitral valve surgery\par 2. Multiple Psych/Medical Issues\par \par Recommendations:\par \par On 4/16/2021 - discussion with the family as follows\par Patient's healthcare proxy is his sister. phone number of his HCP- Sister-Jacey GentileWnjkgleg-[phone].\par I spoke to his sister I have informed her about Mitral regurgitation natural history, eventually may have CHF, may need Teri Valve Surgery. However patient is not an Ideal candidate for surgery because of his comorbidities. I have informed her that I have not even sent him for Surgical Consultation. I have informed her that she has to make a decision about  Advanced Directive's DNR etc.\par \par I discussed echocardiogram results with the patient's sister there is a LVOT obstruction due to the ROBERT we will be initating  beta-blocker therapy Toprol-XL 25 mg once daily.  She is in agreement with the plan.\par \par Once again I have emphasized to the sister that they need to have advanced directive plan in case if he decompensates from this mitral regurgitation and if he has a cardiac arrest, I have strongly recommended them to informed facility about patient's CODE STATUS.  She verbalized understanding\par \par Recommendations: Continue current medical therapy which includes Toprol.\par Follow-up in 6 -12 months\par

## 2022-10-24 NOTE — PHYSICAL EXAM
[Normal] : no edema, no cyanosis, no clubbing, no varicosities [de-identified] : Holosystolic murmur 3/6 heard at apex

## 2022-11-07 ENCOUNTER — OUTPATIENT (OUTPATIENT)
Dept: OUTPATIENT SERVICES | Facility: HOSPITAL | Age: 67
LOS: 1 days | End: 2022-11-07
Payer: COMMERCIAL

## 2022-11-07 DIAGNOSIS — Z98.49 CATARACT EXTRACTION STATUS, UNSPECIFIED EYE: Chronic | ICD-10-CM

## 2022-11-07 DIAGNOSIS — F20.9 SCHIZOPHRENIA, UNSPECIFIED: ICD-10-CM

## 2022-11-07 PROCEDURE — 0225U NFCT DS DNA&RNA 21 SARSCOV2: CPT

## 2023-05-19 ENCOUNTER — APPOINTMENT (OUTPATIENT)
Age: 68
End: 2023-05-19

## 2023-10-23 ENCOUNTER — APPOINTMENT (OUTPATIENT)
Dept: CARDIOLOGY | Facility: CLINIC | Age: 68
End: 2023-10-23

## (undated) DEVICE — VALVE ENDOSCOPE DEFENDO SINGLE USE

## (undated) DEVICE — FORCEP BIOPSY ENDOSCOPIC 2.8MM DISP